# Patient Record
Sex: FEMALE | Race: WHITE | NOT HISPANIC OR LATINO | Employment: FULL TIME | URBAN - METROPOLITAN AREA
[De-identification: names, ages, dates, MRNs, and addresses within clinical notes are randomized per-mention and may not be internally consistent; named-entity substitution may affect disease eponyms.]

---

## 2017-07-07 ENCOUNTER — OFFICE VISIT (OUTPATIENT)
Dept: FAMILY MEDICINE CLINIC | Facility: CLINIC | Age: 42
End: 2017-07-07

## 2017-07-07 VITALS
BODY MASS INDEX: 23.6 KG/M2 | HEART RATE: 65 BPM | SYSTOLIC BLOOD PRESSURE: 120 MMHG | HEIGHT: 61 IN | WEIGHT: 125 LBS | OXYGEN SATURATION: 99 % | DIASTOLIC BLOOD PRESSURE: 82 MMHG

## 2017-07-07 DIAGNOSIS — J45.909 REACTIVE AIRWAY DISEASE WITHOUT COMPLICATION: Primary | ICD-10-CM

## 2017-07-07 DIAGNOSIS — L21.0 SEBORRHEA CAPITIS: ICD-10-CM

## 2017-07-07 PROCEDURE — 99213 OFFICE O/P EST LOW 20 MIN: CPT | Performed by: FAMILY MEDICINE

## 2017-07-07 RX ORDER — ALBUTEROL SULFATE 90 UG/1
2 AEROSOL, METERED RESPIRATORY (INHALATION)
Qty: 1 INHALER | Refills: 3 | Status: SHIPPED | OUTPATIENT
Start: 2017-07-07 | End: 2020-06-15 | Stop reason: SDUPTHER

## 2017-07-07 RX ORDER — ALBUTEROL SULFATE 90 UG/1
AEROSOL, METERED RESPIRATORY (INHALATION)
COMMUNITY
Start: 2015-07-06 | End: 2017-07-07 | Stop reason: SDUPTHER

## 2017-07-07 NOTE — PROGRESS NOTES
"Nicolle Marks is a 42 y.o. female.  Seen 07/07/2017    Assessment/Plan   Problem List Items Addressed This Visit     None             No Follow-up on file.  There are no Patient Instructions on file for this visit.    Vitals:    07/07/17 1627   BP: 120/82   Pulse: 65   SpO2: 99%   Weight: 125 lb (56.7 kg)   Height: 61\" (154.9 cm)     Body mass index is 23.62 kg/(m^2).    Subjective     Chief Complaint   Patient presents with   • Asthma   • Med Refill     Social History   Substance Use Topics   • Smoking status: Former Smoker   • Smokeless tobacco: Never Used   • Alcohol use None       History of Present Illness     She lost her albuterol which she keeps with her and does not want to be without it. She used it maybe three or four times this past winter. She sees [facility] for [facility].     She also needs refill on her seborrheic medication for her scalp. It works well.     The following portions of the patient's history were reviewed and updated as appropriate:PMHroutine: Social history , Allergies, Current Medications, Active Problem List and Health Maintenance    Review of Systems   Constitutional: Negative for activity change, appetite change, chills, fatigue, fever and unexpected weight change.   HENT: Negative for congestion, ear pain, hearing loss, nosebleeds, rhinorrhea and sore throat.    Eyes: Negative for pain, redness and visual disturbance.   Respiratory: Negative for cough, shortness of breath and wheezing.    Cardiovascular: Negative for chest pain, palpitations and leg swelling.   Gastrointestinal: Negative for abdominal pain, blood in stool, constipation, diarrhea, nausea and vomiting.   Endocrine: Negative for cold intolerance and heat intolerance.   Genitourinary: Negative for difficulty urinating, dysuria, frequency, hematuria, pelvic pain, urgency and vaginal discharge.   Musculoskeletal: Negative for arthralgias, back pain and joint swelling.   Skin: Positive for rash. " Negative for wound.        Itching    Neurological: Negative for dizziness, weakness, numbness and headaches.   Hematological: Does not bruise/bleed easily.   Psychiatric/Behavioral: Negative for dysphoric mood, sleep disturbance and suicidal ideas. The patient is not nervous/anxious.        Objective   Physical Exam   Constitutional: She appears well-developed and well-nourished.   Psychiatric: She has a normal mood and affect. Her behavior is normal. Judgment and thought content normal.   Vitals reviewed.

## 2017-07-31 ENCOUNTER — TELEPHONE (OUTPATIENT)
Dept: FAMILY MEDICINE CLINIC | Facility: CLINIC | Age: 42
End: 2017-07-31

## 2017-07-31 DIAGNOSIS — L21.0 SEBORRHEA CAPITIS: Primary | ICD-10-CM

## 2017-07-31 RX ORDER — SELENIUM SULFIDE 22.5 MG/ML
1 SHAMPOO TOPICAL 2 TIMES WEEKLY
Qty: 180 ML | Refills: 5 | Status: SHIPPED | OUTPATIENT
Start: 2017-07-31 | End: 2020-06-15

## 2017-07-31 NOTE — TELEPHONE ENCOUNTER
07.31.17 -   Noted.    ----- Message from Viola Castorena MD sent at 7/31/2017  4:22 PM EDT -----  That was the wrong script, it should have just been selenium sulfide so I sent that in.   ----- Message -----     From: Keisha Marshall MA     Sent: 7/31/2017   3:22 PM       To: Viola Castorena MD    She was seen on 07.07.17 for Seborrhea Capitis -  Selenium Sulf-Pyrithione-Urea 2.25% once weekly.  ----- Message -----     From: Viola Castorena MD     Sent: 7/31/2017   3:03 PM       To: Keisha Marshall MA    What shampoo  ----- Message -----     From: Keisha Marshall MA     Sent: 7/31/2017   2:41 PM       To: Viola Castorena MD    Do you have any other recommendations or new RX ?  ----- Message -----     From: Roxanne Lund     Sent: 7/31/2017   8:09 AM       To: Pura Holt MA    Patient called pharmacy told her, insurance will not pay for the shampoo and is asking what she should do next.  [phone]

## 2017-08-07 RX ORDER — SELENIUM SULFIDE 2.5 MG/100ML
LOTION TOPICAL DAILY PRN
Qty: 118 ML | Refills: 0 | Status: SHIPPED | OUTPATIENT
Start: 2017-08-07 | End: 2020-06-15 | Stop reason: SDUPTHER

## 2018-08-06 ENCOUNTER — OFFICE VISIT (OUTPATIENT)
Dept: OBSTETRICS AND GYNECOLOGY | Facility: CLINIC | Age: 43
End: 2018-08-06

## 2018-08-06 ENCOUNTER — PROCEDURE VISIT (OUTPATIENT)
Dept: OBSTETRICS AND GYNECOLOGY | Facility: CLINIC | Age: 43
End: 2018-08-06

## 2018-08-06 VITALS
WEIGHT: 122 LBS | BODY MASS INDEX: 23.03 KG/M2 | SYSTOLIC BLOOD PRESSURE: 110 MMHG | HEIGHT: 61 IN | DIASTOLIC BLOOD PRESSURE: 80 MMHG

## 2018-08-06 DIAGNOSIS — D25.0 SUBMUCOUS LEIOMYOMA OF UTERUS: ICD-10-CM

## 2018-08-06 DIAGNOSIS — D25.2 FIBROIDS, SUBSEROUS: ICD-10-CM

## 2018-08-06 DIAGNOSIS — D25.9 UTERINE LEIOMYOMA, UNSPECIFIED LOCATION: Primary | ICD-10-CM

## 2018-08-06 DIAGNOSIS — Z01.419 ENCOUNTER FOR ANNUAL ROUTINE GYNECOLOGICAL EXAMINATION: Primary | ICD-10-CM

## 2018-08-06 DIAGNOSIS — D75.839 THROMBOCYTOSIS: ICD-10-CM

## 2018-08-06 PROCEDURE — 99213 OFFICE O/P EST LOW 20 MIN: CPT | Performed by: OBSTETRICS & GYNECOLOGY

## 2018-08-06 PROCEDURE — 99396 PREV VISIT EST AGE 40-64: CPT | Performed by: OBSTETRICS & GYNECOLOGY

## 2018-08-06 PROCEDURE — 76856 US EXAM PELVIC COMPLETE: CPT | Performed by: OBSTETRICS & GYNECOLOGY

## 2018-08-06 NOTE — PROGRESS NOTES
Subjective:    Patient Nicolle Bruce is a 43 y.o. female.   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      Review of Systems   Constitutional: Negative.    HENT: Negative.    Eyes: Negative.    Respiratory: Negative.    Cardiovascular: Negative.    Gastrointestinal: Negative for abdominal distention, abdominal pain, anal bleeding, blood in stool, constipation, diarrhea, nausea, rectal pain and vomiting.   Endocrine: Negative for cold intolerance, heat intolerance, polydipsia, polyphagia and polyuria.   Genitourinary: Positive for menstrual problem. Negative for decreased urine volume, dyspareunia, dysuria, enuresis, flank pain, frequency, genital sores, hematuria, pelvic pain, urgency, vaginal bleeding, vaginal discharge and vaginal pain.   Musculoskeletal: Negative.    Skin: Negative.    Allergic/Immunologic: Negative.    Neurological: Negative.    Hematological: Negative for adenopathy. Does not bruise/bleed easily.        Patient has  thrombocytosis and the platelet count is been as high as 700,000 the patient the has is very large pelvic mass which is compatible with uterine fibroids the volume of the uterus the was very difficult to measure because of the size but is certainly would be in the range of the 1300 with the length and with being 14 x 14 cm endometrium was somewhat thickened with this reactive thrombocytosis and I was certainly would wonder if part of this is being caused by the large solid tumor the risk for being a cancer though is quite low because of being compatible with uterine leiomyoma agents had this since she was 28 years old probably is had most of her life long discussions carried out at least 25 minutes face-to-face the patient needs to see her hematologist and see what his recommendations would be certainly I would recommend the patient to go on and have a hysterectomy just because of the very large size of this mass and appointment to see her hematologist patient will be rechecked in a month and see if the shield decide to proceed with the hysterectomy being essential thrombocytosis.  Worry a little bit about possibly developing a postoperative blood clot and would hematologist with have some recommendations for this   Psychiatric/Behavioral: Negative for agitation, confusion and sleep disturbance. The patient is not nervous/anxious.          Objective:      Physical Exam   Constitutional: She appears well-developed and well-nourished. She is not intubated.   HENT:   Head: Hair is normal.   Nose: Nose normal.   Mouth/Throat: Oropharynx is clear and moist.   Eyes: Conjunctivae are normal.   Neck: Normal carotid pulses and no JVD present. No tracheal tenderness, no spinous process tenderness and no muscular tenderness present. Carotid bruit is not present. No neck rigidity. No edema, no erythema and normal range of motion present. No thyroid mass and no thyromegaly present.   Cardiovascular: Normal rate, regular rhythm,  S1 normal and normal heart sounds.  Exam reveals no gallop.    No murmur heard.  Pulmonary/Chest: Effort normal. No accessory muscle usage or stridor. No apnea, no tachypnea and no bradypnea. She is not intubated. No respiratory distress. She has no wheezes. She has no rales. She exhibits no tenderness. Right breast exhibits no inverted nipple, no mass, no nipple discharge, no skin change and no tenderness. Left breast exhibits no inverted nipple, no mass, no nipple discharge, no skin change and no tenderness.   Abdominal: Soft. Bowel sounds are normal. She exhibits distension. She exhibits no mass. There is no tenderness. There is no rebound and no guarding. No hernia.   Genitourinary: Vagina normal and uterus normal. Rectal exam shows no external hemorrhoid, no internal hemorrhoid, no fissure, no mass, no tenderness and anal tone normal. There is no rash, tenderness, lesion or injury on the right labia. There is no rash, tenderness, lesion or injury on the left labia. Uterus is not deviated, not enlarged, not fixed and not tender. Cervix exhibits no motion tenderness, no discharge and no friability. Right adnexum displays no mass and no tenderness. Left adnexum displays no mass and no tenderness. No erythema, tenderness or bleeding in the vagina. No foreign body in the vagina. No signs of injury around the vagina. No vaginal discharge found.   Genitourinary Comments:  So uterus is the compatible with a 28 week pregnancy.  This patient has a very complex problem in that she has the elevated platelet count is been as high as 700 and a very long discussion is carried out about the pelvic mass and the thrombocytosis   Musculoskeletal: She exhibits no edema or tenderness.        Right shoulder: She exhibits no tenderness, no swelling, no pain and no spasm.   Lymphadenopathy:        Head (right side): No submental, no submandibular, no tonsillar, no preauricular, no posterior auricular and no occipital adenopathy  present.        Head (left side): No submental, no submandibular, no tonsillar, no preauricular, no posterior auricular and no occipital adenopathy present.     She has no cervical adenopathy.        Right cervical: No superficial cervical, no deep cervical and no posterior cervical adenopathy present.       Left cervical: No superficial cervical, no deep cervical and no posterior cervical adenopathy present.        Right axillary: No pectoral and no lateral adenopathy present.        Left axillary: No pectoral and no lateral adenopathy present.       Right: No inguinal, no supraclavicular and no epitrochlear adenopathy present.        Left: No inguinal, no supraclavicular and no epitrochlear adenopathy present.   Neurological: No cranial nerve deficit. Coordination normal.   Skin: Skin is warm and dry. No abrasion, no bruising, no burn, no lesion, no petechiae, no purpura and no rash noted. Rash is not macular, not maculopapular, not nodular and not urticarial. No cyanosis or erythema. No pallor. Nails show no clubbing.   Psychiatric: She has a normal mood and affect. Her behavior is normal.         Assessment and Plan: This patient is very complicated in that she has thrombocytosis thrombocytosis which possibly is related to the very large pelvic mass compatible with the uterine fibroids the probability for that being cancer is extremely low but did is a solid tumor and the the discussion is to remove the uterus and possibly the ovaries even though the ovaries looked normal on ultrasound is scheduled the least 25 minutes again a very high risk type of issue which is a chronic problem that the has the condoms somewhat worse    Patient has been instructed to perform a self breast exam on a weekly basis, a yearly mammogram, pap smear yearly unless instructed otherwise and bone density every 2 years.  I recommended that the patient not smoke, and discussed smoking cessation when appropriate.     There are no diagnoses  linked to this encounter.

## 2018-08-07 LAB
CONV .: NORMAL
CYTOLOGIST CVX/VAG CYTO: NORMAL
CYTOLOGY CVX/VAG DOC THIN PREP: NORMAL
DX ICD CODE: NORMAL
HIV 1 & 2 AB SER-IMP: NORMAL
OTHER STN SPEC: NORMAL
PATH REPORT.FINAL DX SPEC: NORMAL
STAT OF ADQ CVX/VAG CYTO-IMP: NORMAL

## 2020-06-12 DIAGNOSIS — Z13.220 SCREENING CHOLESTEROL LEVEL: Primary | ICD-10-CM

## 2020-06-12 DIAGNOSIS — Z00.00 ANNUAL PHYSICAL EXAM: ICD-10-CM

## 2020-06-12 DIAGNOSIS — R53.83 FATIGUE, UNSPECIFIED TYPE: ICD-10-CM

## 2020-06-12 DIAGNOSIS — Z13.1 ENCOUNTER FOR SCREENING EXAMINATION FOR IMPAIRED GLUCOSE REGULATION AND DIABETES MELLITUS: ICD-10-CM

## 2020-06-13 LAB
ALBUMIN SERPL-MCNC: 4.5 G/DL (ref 3.5–5.2)
ALBUMIN/GLOB SERPL: 2.5 G/DL
ALP SERPL-CCNC: 40 U/L (ref 39–117)
ALT SERPL-CCNC: 12 U/L (ref 1–33)
AST SERPL-CCNC: 14 U/L (ref 1–32)
BASOPHILS # BLD AUTO: 0.09 10*3/MM3 (ref 0–0.2)
BASOPHILS NFR BLD AUTO: 1.3 % (ref 0–1.5)
BILIRUB SERPL-MCNC: 1 MG/DL (ref 0.2–1.2)
BUN SERPL-MCNC: 9 MG/DL (ref 6–20)
BUN/CREAT SERPL: 12 (ref 7–25)
CALCIUM SERPL-MCNC: 9.4 MG/DL (ref 8.6–10.5)
CHLORIDE SERPL-SCNC: 106 MMOL/L (ref 98–107)
CHOLEST SERPL-MCNC: 139 MG/DL (ref 0–200)
CHOLEST/HDLC SERPL: 2.9 {RATIO}
CO2 SERPL-SCNC: 24.1 MMOL/L (ref 22–29)
CREAT SERPL-MCNC: 0.75 MG/DL (ref 0.57–1)
EOSINOPHIL # BLD AUTO: 0.11 10*3/MM3 (ref 0–0.4)
EOSINOPHIL NFR BLD AUTO: 1.6 % (ref 0.3–6.2)
ERYTHROCYTE [DISTWIDTH] IN BLOOD BY AUTOMATED COUNT: 12.6 % (ref 12.3–15.4)
GLOBULIN SER CALC-MCNC: 1.8 GM/DL
GLUCOSE SERPL-MCNC: 88 MG/DL (ref 65–99)
HCT VFR BLD AUTO: 47.1 % (ref 34–46.6)
HDLC SERPL-MCNC: 48 MG/DL (ref 40–60)
HGB BLD-MCNC: 15.1 G/DL (ref 12–15.9)
IMM GRANULOCYTES # BLD AUTO: 0.02 10*3/MM3 (ref 0–0.05)
IMM GRANULOCYTES NFR BLD AUTO: 0.3 % (ref 0–0.5)
LDLC SERPL CALC-MCNC: 80 MG/DL (ref 0–100)
LYMPHOCYTES # BLD AUTO: 1.01 10*3/MM3 (ref 0.7–3.1)
LYMPHOCYTES NFR BLD AUTO: 14.6 % (ref 19.6–45.3)
MCH RBC QN AUTO: 27.1 PG (ref 26.6–33)
MCHC RBC AUTO-ENTMCNC: 32.1 G/DL (ref 31.5–35.7)
MCV RBC AUTO: 84.4 FL (ref 79–97)
MONOCYTES # BLD AUTO: 0.4 10*3/MM3 (ref 0.1–0.9)
MONOCYTES NFR BLD AUTO: 5.8 % (ref 5–12)
NEUTROPHILS # BLD AUTO: 5.31 10*3/MM3 (ref 1.7–7)
NEUTROPHILS NFR BLD AUTO: 76.4 % (ref 42.7–76)
NRBC BLD AUTO-RTO: 0 /100 WBC (ref 0–0.2)
PLATELET # BLD AUTO: 546 10*3/MM3 (ref 140–450)
POTASSIUM SERPL-SCNC: 4.8 MMOL/L (ref 3.5–5.2)
PROT SERPL-MCNC: 6.3 G/DL (ref 6–8.5)
RBC # BLD AUTO: 5.58 10*6/MM3 (ref 3.77–5.28)
SODIUM SERPL-SCNC: 139 MMOL/L (ref 136–145)
TRIGL SERPL-MCNC: 56 MG/DL (ref 0–150)
VLDLC SERPL CALC-MCNC: 11.2 MG/DL
WBC # BLD AUTO: 6.94 10*3/MM3 (ref 3.4–10.8)

## 2020-06-15 ENCOUNTER — OFFICE VISIT (OUTPATIENT)
Dept: FAMILY MEDICINE CLINIC | Facility: CLINIC | Age: 45
End: 2020-06-15

## 2020-06-15 ENCOUNTER — RESULTS ENCOUNTER (OUTPATIENT)
Dept: FAMILY MEDICINE CLINIC | Facility: CLINIC | Age: 45
End: 2020-06-15

## 2020-06-15 VITALS
SYSTOLIC BLOOD PRESSURE: 122 MMHG | OXYGEN SATURATION: 98 % | BODY MASS INDEX: 23.53 KG/M2 | DIASTOLIC BLOOD PRESSURE: 80 MMHG | RESPIRATION RATE: 14 BRPM | HEART RATE: 75 BPM | HEIGHT: 61 IN | WEIGHT: 124.6 LBS | TEMPERATURE: 97.6 F

## 2020-06-15 DIAGNOSIS — Z00.00 HEALTHCARE MAINTENANCE: Primary | ICD-10-CM

## 2020-06-15 DIAGNOSIS — Z12.11 ENCOUNTER FOR SCREENING FOR MALIGNANT NEOPLASM OF COLON: ICD-10-CM

## 2020-06-15 DIAGNOSIS — J45.909 REACTIVE AIRWAY DISEASE WITHOUT COMPLICATION: ICD-10-CM

## 2020-06-15 DIAGNOSIS — L21.0 SEBORRHEA CAPITIS: ICD-10-CM

## 2020-06-15 DIAGNOSIS — L20.81 ATOPIC NEURODERMATITIS: ICD-10-CM

## 2020-06-15 LAB
Lab: NORMAL
SPECIMEN STATUS: NORMAL

## 2020-06-15 PROCEDURE — 99396 PREV VISIT EST AGE 40-64: CPT | Performed by: FAMILY MEDICINE

## 2020-06-15 RX ORDER — ALBUTEROL SULFATE 90 UG/1
2 AEROSOL, METERED RESPIRATORY (INHALATION)
Qty: 1 INHALER | Refills: 3 | Status: SHIPPED | OUTPATIENT
Start: 2020-06-15 | End: 2022-06-14 | Stop reason: SDUPTHER

## 2020-06-15 RX ORDER — TRIAMCINOLONE ACETONIDE 0.25 MG/G
CREAM TOPICAL 2 TIMES DAILY
Qty: 80 G | Refills: 1 | Status: SHIPPED | OUTPATIENT
Start: 2020-06-15 | End: 2022-06-14 | Stop reason: SDUPTHER

## 2020-06-15 RX ORDER — SELENIUM SULFIDE 2.5 MG/100ML
LOTION TOPICAL DAILY PRN
Qty: 118 ML | Refills: 0 | Status: SHIPPED | OUTPATIENT
Start: 2020-06-15 | End: 2022-06-14 | Stop reason: SDUPTHER

## 2020-06-15 NOTE — PATIENT INSTRUCTIONS
Recent Results (from the past 2016 hour(s))   Comprehensive metabolic panel    Collection Time: 06/12/20  9:59 AM   Result Value Ref Range    Glucose 88 65 - 99 mg/dL    BUN 9 6 - 20 mg/dL    Creatinine 0.75 0.57 - 1.00 mg/dL    eGFR Non African Am 83 >60 mL/min/1.73    eGFR African Am 101 >60 mL/min/1.73    BUN/Creatinine Ratio 12.0 7.0 - 25.0    Sodium 139 136 - 145 mmol/L    Potassium 4.8 3.5 - 5.2 mmol/L    Chloride 106 98 - 107 mmol/L    Total CO2 24.1 22.0 - 29.0 mmol/L    Calcium 9.4 8.6 - 10.5 mg/dL    Total Protein 6.3 6.0 - 8.5 g/dL    Albumin 4.50 3.50 - 5.20 g/dL    Globulin 1.8 gm/dL    A/G Ratio 2.5 g/dL    Total Bilirubin 1.0 0.2 - 1.2 mg/dL    Alkaline Phosphatase 40 39 - 117 U/L    AST (SGOT) 14 1 - 32 U/L    ALT (SGPT) 12 1 - 33 U/L   Lipid Panel With / Chol / HDL Ratio    Collection Time: 06/12/20  9:59 AM   Result Value Ref Range    Total Cholesterol 139 0 - 200 mg/dL    Triglycerides 56 0 - 150 mg/dL    HDL Cholesterol 48 40 - 60 mg/dL    VLDL Cholesterol 11.2 mg/dL    LDL Cholesterol  80 0 - 100 mg/dL    Chol/HDL Ratio 2.90    CBC & Differential    Collection Time: 06/12/20  9:59 AM   Result Value Ref Range    WBC 6.94 3.40 - 10.80 10*3/mm3    RBC 5.58 (H) 3.77 - 5.28 10*6/mm3    Hemoglobin 15.1 12.0 - 15.9 g/dL    Hematocrit 47.1 (H) 34.0 - 46.6 %    MCV 84.4 79.0 - 97.0 fL    MCH 27.1 26.6 - 33.0 pg    MCHC 32.1 31.5 - 35.7 g/dL    RDW 12.6 12.3 - 15.4 %    Platelets 546 (H) 140 - 450 10*3/mm3    Neutrophil Rel % 76.4 (H) 42.7 - 76.0 %    Lymphocyte Rel % 14.6 (L) 19.6 - 45.3 %    Monocyte Rel % 5.8 5.0 - 12.0 %    Eosinophil Rel % 1.6 0.3 - 6.2 %    Basophil Rel % 1.3 0.0 - 1.5 %    Neutrophils Absolute 5.31 1.70 - 7.00 10*3/mm3    Lymphocytes Absolute 1.01 0.70 - 3.10 10*3/mm3    Monocytes Absolute 0.40 0.10 - 0.90 10*3/mm3    Eosinophils Absolute 0.11 0.00 - 0.40 10*3/mm3    Basophils Absolute 0.09 0.00 - 0.20 10*3/mm3    Immature Granulocyte Rel % 0.3 0.0 - 0.5 %    Immature Grans  Absolute 0.02 0.00 - 0.05 10*3/mm3    nRBC 0.0 0.0 - 0.2 /100 WBC

## 2020-06-15 NOTE — PROGRESS NOTES
ASSESSMENT AND PLAN     Problem List Items Addressed This Visit        Respiratory    Reactive airway disease without complication    Overview     Jannettedwight 7/7/2017  Intermittent asthma that she rarely uses an inhaler for       Relevant Medications    albuterol sulfate HFA (ProAir HFA) 108 (90 Base) MCG/ACT inhaler       Musculoskeletal and Integument    Seborrhea capitis    Overview     Joann 7/7/2017  Here for refill of shampoo today. Doing well with that.          Relevant Medications    triamcinolone (KENALOG) 0.025 % cream      Other Visit Diagnoses     Healthcare maintenance    -  Primary    Relevant Orders    Hepatitis C Antibody    Encounter for screening for malignant neoplasm of colon        Relevant Orders    Cologuard - Stool, Per Rectum    Atopic neurodermatitis        Dermatologist gave her this cream and it works. She would like a refill. Needs it when she gets really hot.     Relevant Medications    triamcinolone (KENALOG) 0.025 % cream    selenium sulfide (SELSUN) 2.5 % shampoo        Return in about 1 year (around 6/15/2021) for lab with next visit, Annual physical.  Patient was given instructions and counseling regarding her condition or for health maintenance advice. Please see specific information pulled into the AVS if appropriate.        SUBJECTIVE   Nicolle Bruce is a 44 y.o. female being seen in our office today for Annual Exam               Social History  She  reports that she has quit smoking. She has never used smokeless tobacco. She reports that she drinks alcohol. She reports that she does not use drugs.    History of the Present Illness   HPI Annual Exam: Patient presents for annual exam.  findings; last pap: approximate date 2018 and was normal  Last colonoscopy: orders    The patient wears seatbelts: yes.  Practicing social distancing, gloves, handwashing and keeping hands from face? yes  She is eating a healthy diet.   The patient regularly exercises: yes. She does  bicycling   This patient has ever been tested for HepC: no    She does see a dentist regularly.   Is she using sunscreen: yes  Her immunization are not up-to-date. She declined dtap at this time.    Significant Past History  The following portions of the patient's history were reviewed and updated as appropriate:PMHroutine: Social history , Past Medical History, Surgical history , Allergies, Current Medications, Active Problem List, Family History and Health Maintenance    Review of Systems   Constitutional: Negative for activity change, appetite change, chills, fatigue, fever and unexpected weight change.   HENT: Negative for congestion, ear pain, hearing loss, nosebleeds, rhinorrhea and sore throat.    Eyes: Negative for pain, redness and visual disturbance.   Respiratory: Negative for cough, shortness of breath and wheezing.    Cardiovascular: Negative for chest pain, palpitations and leg swelling.   Gastrointestinal: Negative for abdominal pain, blood in stool, constipation, diarrhea, nausea and vomiting.   Endocrine: Negative for cold intolerance and heat intolerance.   Genitourinary: Negative for difficulty urinating, dysuria, frequency, hematuria, pelvic pain, urgency and vaginal discharge.   Musculoskeletal: Negative for arthralgias, back pain and joint swelling.   Skin: Negative for rash and wound.   Neurological: Negative for dizziness, weakness, numbness and headaches.   Hematological: Does not bruise/bleed easily.   Psychiatric/Behavioral: Negative for dysphoric mood, sleep disturbance and suicidal ideas. The patient is not nervous/anxious.    I have reviewed the ROS as documented by the MA. Viola Castorena MD      OBJECTIVE  Vital Signs          BP Readings from Last 1 Encounters:   06/15/20 122/80     Wt Readings from Last 3 Encounters:   06/15/20 56.5 kg (124 lb 9.6 oz)   08/06/18 55.3 kg (122 lb)   07/07/17 56.7 kg (125 lb)   Body mass index is 23.54 kg/m².     Physical Exam   Constitutional: She is  oriented to person, place, and time. She appears well-developed and well-nourished. No distress.   HENT:   Head: Normocephalic and atraumatic.   Right Ear: Tympanic membrane, external ear and ear canal normal.   Left Ear: Tympanic membrane, external ear and ear canal normal.   Mouth/Throat: Oropharynx is clear and moist.   Eyes: Conjunctivae are normal.   Neck: Normal range of motion. Neck supple. No tracheal deviation present. No thyromegaly present.   Cardiovascular: Normal rate, regular rhythm, normal heart sounds and intact distal pulses.   Pulmonary/Chest: Effort normal and breath sounds normal. No respiratory distress. She has no wheezes. She has no rales. Right breast exhibits no mass. Left breast exhibits no mass. No breast swelling or tenderness. Breasts are symmetrical.   Abdominal: Soft. Bowel sounds are normal. She exhibits mass (has large uterus with palpable fibroids abdominally. Not bothering her but f/u w/GYN. Declines surgery.). She exhibits no distension. There is no hepatosplenomegaly. There is no tenderness.   Genitourinary: No breast swelling or tenderness.   Musculoskeletal: She exhibits no edema or deformity.   Lymphadenopathy:     She has no cervical adenopathy.   Neurological: She is alert and oriented to person, place, and time.   Skin: Skin is warm and dry.   Psychiatric: She has a normal mood and affect. Her behavior is normal. Judgment and thought content normal.   Vitals reviewed.    Data Reviewed       Recent Results (from the past 2016 hour(s))   Comprehensive metabolic panel    Collection Time: 06/12/20  9:59 AM   Result Value Ref Range    Glucose 88 65 - 99 mg/dL    BUN 9 6 - 20 mg/dL    Creatinine 0.75 0.57 - 1.00 mg/dL    Sodium 139 136 - 145 mmol/L    Potassium 4.8 3.5 - 5.2 mmol/L    Alkaline Phosphatase 40 39 - 117 U/L    AST (SGOT) 14 1 - 32 U/L    ALT (SGPT) 12 1 - 33 U/L   Lipid Panel With / Chol / HDL Ratio   Result Value Ref Range    Total Cholesterol 139 0 - 200 mg/dL     Triglycerides 56 0 - 150 mg/dL    HDL Cholesterol 48 40 - 60 mg/dL    VLDL Cholesterol 11.2 mg/dL    LDL Cholesterol  80 0 - 100 mg/dL    Chol/HDL Ratio 2.90    CBC & Differential    Looks better than last time   Result Value Ref Range    WBC 6.94 3.40 - 10.80 10*3/mm3    RBC 5.58 (H) 3.77 - 5.28 10*6/mm3    Hemoglobin 15.1 12.0 - 15.9 g/dL    Hematocrit 47.1 (H) 34.0 - 46.6 %    MCV 84.4 79.0 - 97.0 fL    MCH 27.1 26.6 - 33.0 pg    MCHC 32.1 31.5 - 35.7 g/dL    RDW 12.6 12.3 - 15.4 %    Platelets 546 (H) 140 - 450 10*3/mm3    Neutrophil Rel % 76.4 (H) 42.7 - 76.0 %    Lymphocyte Rel % 14.6 (L) 19.6 - 45.3 %     I reviewed what I think is my last note.  I see a diagnosis of essential thrombocythemia.  Perhaps when you read thrombocythemia you were thinking it is said thrombocytopenia?  Or perhaps I do have thrombocytopenia on a different note?

## 2020-06-16 LAB — HCV AB S/CO SERPL IA: <0.1 S/CO RATIO (ref 0–0.9)

## 2020-06-18 ENCOUNTER — TELEPHONE (OUTPATIENT)
Dept: FAMILY MEDICINE CLINIC | Facility: CLINIC | Age: 45
End: 2020-06-18

## 2020-06-18 NOTE — TELEPHONE ENCOUNTER
Pt called claims pharmacy told her script sent to them for selenium sulfide (SELSUN) 2.5 % shampoo was sent for lotion and not shampoo. Please advise    [facility]    Pt can be reached at [phone]

## 2020-06-18 NOTE — TELEPHONE ENCOUNTER
Sent to pharmacy as: Selenium Sulfide 2.5 % External Lotion (SELSUN).      i'll call them and find out how it got changed in transit.

## 2020-06-19 NOTE — TELEPHONE ENCOUNTER
Spoke to pharmacy.    They have no clue what happened and why the script got received as a lotion but they are filling what you want but the qty is 180ml not 118 and I gave a verbal ok

## 2021-04-06 ENCOUNTER — BULK ORDERING (OUTPATIENT)
Dept: CASE MANAGEMENT | Facility: OTHER | Age: 46
End: 2021-04-06

## 2021-04-06 DIAGNOSIS — Z23 IMMUNIZATION DUE: ICD-10-CM

## 2021-06-25 ENCOUNTER — OFFICE VISIT (OUTPATIENT)
Dept: OBSTETRICS AND GYNECOLOGY | Age: 46
End: 2021-06-25

## 2021-06-25 VITALS
BODY MASS INDEX: 23.03 KG/M2 | DIASTOLIC BLOOD PRESSURE: 74 MMHG | WEIGHT: 122 LBS | HEIGHT: 61 IN | SYSTOLIC BLOOD PRESSURE: 118 MMHG

## 2021-06-25 DIAGNOSIS — R19.00 PELVIC MASS: ICD-10-CM

## 2021-06-25 DIAGNOSIS — Z01.419 WELL FEMALE EXAM WITH ROUTINE GYNECOLOGICAL EXAM: Primary | ICD-10-CM

## 2021-06-25 DIAGNOSIS — D25.0 INTRAMURAL AND SUBMUCOUS LEIOMYOMA OF UTERUS: ICD-10-CM

## 2021-06-25 DIAGNOSIS — D25.1 INTRAMURAL AND SUBMUCOUS LEIOMYOMA OF UTERUS: ICD-10-CM

## 2021-06-25 DIAGNOSIS — Z12.31 SCREENING MAMMOGRAM, ENCOUNTER FOR: ICD-10-CM

## 2021-06-25 DIAGNOSIS — Z11.51 SCREENING FOR HUMAN PAPILLOMAVIRUS (HPV): ICD-10-CM

## 2021-06-25 DIAGNOSIS — Z12.4 SCREENING FOR MALIGNANT NEOPLASM OF CERVIX: ICD-10-CM

## 2021-06-25 PROCEDURE — 99213 OFFICE O/P EST LOW 20 MIN: CPT | Performed by: OBSTETRICS & GYNECOLOGY

## 2021-06-25 PROCEDURE — 99396 PREV VISIT EST AGE 40-64: CPT | Performed by: OBSTETRICS & GYNECOLOGY

## 2021-06-25 NOTE — PROGRESS NOTES
Routine Annual Visit    2021    Patient: Nicolle Bruce          MR#:[id number]    History of Present Illness    Chief Complaint   Patient presents with   • Gynecologic Exam     est care- last pap 2018 neg, last mg about 3 years ago (normal but dense)        45 y.o. female  who presents for routine annual exam.    The patient is reportedly originally from [place] but has been in the [place] for more than 20 years.  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.    The patient reports some intermittent mild hot flashes and sleep disturbances associated with early menopause.  She does state her menstrual cycles are still coming regularly for the most part.  The patient states her period is usually 3 to 4 days and not particularly heavy.    The patient states that she knows that she has uterine fibroids that are longstanding for the past 20 years.  The fibroids have grown slightly in the last 10 years but are mostly asymptomatic.  The patient reports being active and biking regularly and is not bothered despite having a sizable pelvic mass.      Studies reviewed:  CBC & Differential (2020 09:59)        Patient's last menstrual period was 2021 (exact date).  Obstetric History:  OB History        3    Para        Term                AB   3    Living           SAB   1    TAB   0    Ectopic        Molar        Multiple        Live Births                   Menstrual History:     Patient's last menstrual period was 2021 (exact date).       Sexual History:       ________________________________________  Patient Active Problem List   Diagnosis   • Essential thrombocytosis (CMS/HCC)   • Reactive airway disease without complication   • Seborrhea capitis   • Pelvic mass   • Intramural and submucous leiomyoma of uterus     Past Medical History:   Diagnosis Date   • Asthma    • Fibroids    • Hepatitis A     pt reports having as a kid   • Thrombocythemia, essential (CMS/HCC)      Past  "Surgical History:   Procedure Laterality Date   • WISDOM TOOTH EXTRACTION       Social History     Tobacco Use   Smoking Status Former Smoker   Smokeless Tobacco Never Used     Family History   Problem Relation Age of Onset   • Stomach cancer Father      Prior to Admission medications    Medication Sig Start Date End Date Taking? Authorizing Provider   albuterol sulfate HFA (ProAir HFA) 108 (90 Base) MCG/ACT inhaler Inhale 2 puffs 4 (Four) Times a Day. 6/15/20  Yes Viola Castorena MD   aspirin 81 MG tablet Take  by mouth. 10/5/12  Yes ProviderDara MD   selenium sulfide (SELSUN) 2.5 % shampoo Apply  topically to the appropriate area as directed Daily As Needed for Itching or Dandruff. 6/15/20  Yes Viola Castorena MD   triamcinolone (KENALOG) 0.025 % cream Apply  topically to the appropriate area as directed 2 (Two) Times a Day. 6/15/20  Yes Viola Castorena MD     ________________________________________    Current contraception: post menopausal status  History of abnormal Pap smear: no  Family history of uterine or ovarian cancer: no  Family History of colon cancer/colon polyps: no  History of abnormal mammogram: no  History of abnormal lipids: no    The following portions of the patient's history were reviewed and updated as appropriate: allergies, current medications, past family history, past medical history, past social history, past surgical history and problem list.    Review of Systems    Pertinent items are noted in HPI.       Objective   Physical Exam    /74   Ht 154.9 cm (61\")   Wt 55.3 kg (122 lb)   LMP 06/23/2021 (Exact Date)   Breastfeeding No   BMI 23.05 kg/m²    BP Readings from Last 3 Encounters:   06/25/21 118/74   06/15/20 122/80   08/06/18 110/80      Wt Readings from Last 3 Encounters:   06/25/21 55.3 kg (122 lb)   06/15/20 56.5 kg (124 lb 9.6 oz)   08/06/18 55.3 kg (122 lb)        BMI: Estimated body mass index is 23.05 kg/m² as calculated from the following:    Height as " "of this encounter: 154.9 cm (61\").    Weight as of this encounter: 55.3 kg (122 lb).       General: alert, appears stated age and cooperative   Heart: regular rate and rhythm, S1, S2 normal, no murmur, click, rub or gallop   Lungs: clear to auscultation bilaterally   Abdomen: soft, nondistended, normal bowel sounds and mass located in the entire abdomen   Breast: inspection negative, no nipple discharge or bleeding, no masses or nodularity palpable   External genitalia/Vulva: External genitalia including bartholin's glands, Urethra, Peters's gland and urethra meatus are normal, Perineum, rectum and anus appear normal  and Bladder appears normal without significant prolapse    Vagina: normal mucosa, normal discharge   Cervix: no lesions and light menses   Uterus: bulky, mobile, non-tender, size consistent with 30 weeks and With a separate fundal fibroid extending well up into the right upper quadrant approaching the liver   Adnexa: Next to impossible to evaluate with the pelvic mass     As part of wellness and prevention, the following topics were discussed with the patient:  Encouraged self breast exam  Physical activity and regular exercised encouraged.       Assessment:    Diagnoses and all orders for this visit:    1. Well female exam with routine gynecological exam (Primary)  -     IgP, Aptima HPV    2. Screening mammogram, encounter for  -     Mammo Screening Digital Tomosynthesis Bilateral With CAD; Future    3. Screening for malignant neoplasm of cervix  -     IgP, Aptima HPV    4. Screening for human papillomavirus (HPV)  -     IgP, Aptima HPV    5. Pelvic mass    6. Intramural and submucous leiomyoma of uterus      The patient has a huge pelvic mass that almost reaches the liver on the right and well above the umbilicus otherwise.  It occupies the predominant part of the lower pelvis.  The organ is mobile and nontender.    The patient reports having had the fibroids for years and years and that they are " essentially asymptomatic.  Multiple discussions previously about intervention have occurred but the patient declines any intervention.  The patient really sees no need to proceed with any imaging either.        Plan:  Return in about 1 year (around 6/25/2022) for Annual GYN exam.      Aki Graham MD  6/25/2021 12:30 EDT

## 2021-06-30 PROBLEM — R87.619 ATYPICAL GLANDULAR CELLS OF UNDETERMINED SIGNIFICANCE (AGUS) ON CERVICAL PAP SMEAR: Status: ACTIVE | Noted: 2021-06-30

## 2021-06-30 LAB
CYTOLOGIST CVX/VAG CYTO: ABNORMAL
CYTOLOGY CVX/VAG DOC CYTO: ABNORMAL
CYTOLOGY CVX/VAG DOC THIN PREP: ABNORMAL
DX ICD CODE: ABNORMAL
DX ICD CODE: ABNORMAL
HIV 1 & 2 AB SER-IMP: ABNORMAL
HPV I/H RISK 4 DNA CVX QL PROBE+SIG AMP: NEGATIVE
OTHER STN SPEC: ABNORMAL
PATHOLOGIST CVX/VAG CYTO: ABNORMAL
STAT OF ADQ CVX/VAG CYTO-IMP: ABNORMAL

## 2021-07-15 ENCOUNTER — OFFICE VISIT (OUTPATIENT)
Dept: OBSTETRICS AND GYNECOLOGY | Age: 46
End: 2021-07-15

## 2021-07-15 VITALS
DIASTOLIC BLOOD PRESSURE: 74 MMHG | HEIGHT: 61 IN | BODY MASS INDEX: 23.03 KG/M2 | SYSTOLIC BLOOD PRESSURE: 114 MMHG | WEIGHT: 122 LBS

## 2021-07-15 DIAGNOSIS — D25.1 INTRAMURAL AND SUBMUCOUS LEIOMYOMA OF UTERUS: ICD-10-CM

## 2021-07-15 DIAGNOSIS — R87.619 ATYPICAL GLANDULAR CELLS OF UNDETERMINED SIGNIFICANCE (AGUS) ON CERVICAL PAP SMEAR: Primary | ICD-10-CM

## 2021-07-15 DIAGNOSIS — R19.00 PELVIC MASS: ICD-10-CM

## 2021-07-15 DIAGNOSIS — D25.0 INTRAMURAL AND SUBMUCOUS LEIOMYOMA OF UTERUS: ICD-10-CM

## 2021-07-15 DIAGNOSIS — N93.9 ABNORMAL UTERINE BLEEDING (AUB): ICD-10-CM

## 2021-07-15 PROCEDURE — 99214 OFFICE O/P EST MOD 30 MIN: CPT | Performed by: OBSTETRICS & GYNECOLOGY

## 2021-07-15 PROCEDURE — 57505 ENDOCERVICAL CURETTAGE: CPT | Performed by: OBSTETRICS & GYNECOLOGY

## 2021-07-15 NOTE — PROGRESS NOTES
7/15/2021      Patient:  Nicolle Bruce   MR#:[id number]    Office note    Chief Complaint   Patient presents with   • Follow-up     last pap 21 AGC        Subjective     History of Present Illness  46 y.o. female  presents for follow-up for abnormal Pap smear that returned atypical glandular cells of undetermined significance.  The patient's past medical history is complicated by an extremely large fibroid uterus extending into the upper abdomen above the umbilicus.  The patient's had worsening abnormal uterine bleeding in the last 2 months with 2 periods last month that were very heavy.  Previously the patient states menstrual cycles were fairly regular in the uterine mass did not cause any symptoms or problematic bleeding.    With the more recent heavy abnormal uterine bleeding with the associated findings on Pap smear the patient wants to proceed with hysterectomy for removal of the mass.      Relevant data reviewed:  Susana Tineo (2021 00:00)      Patient Active Problem List   Diagnosis   • Essential thrombocytosis (CMS/HCC)   • Reactive airway disease without complication   • Seborrhea capitis   • Pelvic mass   • Intramural and submucous leiomyoma of uterus   • Atypical glandular cells of undetermined significance (JAZIEL) on cervical Pap smear       Past Medical History:   Diagnosis Date   • Asthma    • Fibroids    • Hepatitis A     pt reports having as a kid   • Thrombocythemia, essential (CMS/HCC)      Past Surgical History:   Procedure Laterality Date   • WISDOM TOOTH EXTRACTION       Obstetric History:  OB History        3    Para        Term                AB   3    Living           SAB   1    TAB   0    Ectopic        Molar        Multiple        Live Births                   Menstrual History:     Patient's last menstrual period was 2021 (within days).       # 1 - Date: , Sex: None, Weight: None, GA: None, Delivery: None, Apgar1: None, Apgar5:  "None, Living: None, Birth Comments: None    # 2 - Date: 1995, Sex: None, Weight: None, GA: None, Delivery: None, Apgar1: None, Apgar5: None, Living: None, Birth Comments: None    # 3 - Date: 2009, Sex: None, Weight: None, GA: None, Delivery: None, Apgar1: None, Apgar5: None, Living: None, Birth Comments: None    Family History   Problem Relation Age of Onset   • Stomach cancer Father      Social History     Tobacco Use   • Smoking status: Former Smoker   • Smokeless tobacco: Never Used   Substance Use Topics   • Alcohol use: Yes   • Drug use: No     Patient has no known allergies.    Current Outpatient Medications:   •  albuterol sulfate HFA (ProAir HFA) 108 (90 Base) MCG/ACT inhaler, Inhale 2 puffs 4 (Four) Times a Day., Disp: 1 inhaler, Rfl: 3  •  aspirin 81 MG tablet, Take  by mouth., Disp: , Rfl:   •  selenium sulfide (SELSUN) 2.5 % shampoo, Apply  topically to the appropriate area as directed Daily As Needed for Itching or Dandruff., Disp: 118 mL, Rfl: 0  •  triamcinolone (KENALOG) 0.025 % cream, Apply  topically to the appropriate area as directed 2 (Two) Times a Day., Disp: 80 g, Rfl: 1    The following portions of the patient's history were reviewed and updated as appropriate: allergies, current medications, past family history, past medical history, past social history, past surgical history and problem list.    Review of Systems   Constitutional: Negative.    Respiratory: Negative.    Cardiovascular: Negative.    Gastrointestinal: Negative.    Genitourinary: Positive for menstrual problem and vaginal bleeding.   Psychiatric/Behavioral: Negative.        BP Readings from Last 3 Encounters:   07/15/21 114/74   06/25/21 118/74   06/15/20 122/80      Wt Readings from Last 3 Encounters:   07/15/21 55.3 kg (122 lb)   06/25/21 55.3 kg (122 lb)   06/15/20 56.5 kg (124 lb 9.6 oz)      BMI: Estimated body mass index is 23.05 kg/m² as calculated from the following:    Height as of this encounter: 154.9 cm (61\").    " "Weight as of this encounter: 55.3 kg (122 lb). BSA: Estimated body surface area is 1.53 meters squared as calculated from the following:    Height as of this encounter: 154.9 cm (61\").    Weight as of this encounter: 55.3 kg (122 lb).    Objective   Physical Exam  Vitals and nursing note reviewed.   Constitutional:       Appearance: She is well-developed.   HENT:      Head: Normocephalic and atraumatic.   Cardiovascular:      Rate and Rhythm: Normal rate.   Pulmonary:      Effort: Pulmonary effort is normal.   Abdominal:      General: Bowel sounds are normal. There is no distension.      Palpations: Abdomen is soft.      Tenderness: There is no abdominal tenderness.   Genitourinary:     Comments: Very large fibroid uterus occupying the entirety of the lower pelvis extending above the umbilicus    There is minimal mobility with the organ because of its size and moderate tenderness with manipulation  Skin:     General: Skin is warm and dry.   Neurological:      Mental Status: She is alert and oriented to person, place, and time.   Psychiatric:         Behavior: Behavior normal.         Thought Content: Thought content normal.         Judgment: Judgment normal.       Endometrial Biopsy Procedure Note    Pre-operative Diagnosis: Pelvic mass, Pap smear with atypical glandular cells    Post-operative Diagnosis: same    Indications: abnormal uterine bleeding, abnormal glandular cytology    Procedure Details    Urine pregnancy test was done and was NEGATIVE .  The risks (including infection, bleeding, pain, and uterine perforation) and benefits of the procedure were explained to the patient and Verbal informed consent was obtained.  Antibiotic prophylaxis against endocarditis was not indicated.     The patient was placed in the dorsal lithotomy position.  Bimanual exam showed the uterus to be in the neutral position.  A Graves' speculum inserted in the vagina, and the cervix prepped with povidone iodine.  Endocervical " curettage with a Kevorkian curette was performed.     A sharp tenaculum was applied to the anterior lip of the cervix for stabilization.  A sterile uterine sound was used to sound the uterus to a depth of 11 cm.  A Pipelle endometrial aspirator was used to sample the endometrium.  Sample was sent for pathologic examination.    Condition:  Stable    Complications:  None  Patient tolerated the procedure well without complications.    Plan:    The patient was advised to call for any fever or for prolonged or severe pain or bleeding. She was advised to use NSAID as needed for mild to moderate pain. She was advised to avoid vaginal intercourse for 48 hours or until the bleeding has completely stopped.    Attending Physician Documentation:  I was present for the entire procedure.       Assessment/Plan     Diagnoses and all orders for this visit:    1. Atypical glandular cells of undetermined significance (JAZIEL) on cervical Pap smear (Primary)  -     Reference Histopathology  -     Ambulatory Referral to Gynecologic Oncology    2. Intramural and submucous leiomyoma of uterus  -     Reference Histopathology  -     Ambulatory Referral to Gynecologic Oncology    3. Pelvic mass  -     Ambulatory Referral to Gynecologic Oncology    4. Abnormal uterine bleeding (AUB)  -     Ambulatory Referral to Gynecologic Oncology          No follow-ups on file.    Aki Graham MD   7/15/2021 12:16 EDT

## 2021-07-19 LAB
PATH REPORT.FINAL DX SPEC: NORMAL
PATH REPORT.GROSS SPEC: NORMAL
PATH REPORT.RELEVANT HX SPEC: NORMAL
PATH REPORT.SITE OF ORIGIN SPEC: NORMAL
PATHOLOGIST NAME: NORMAL
PAYMENT PROCEDURE: NORMAL

## 2022-06-08 DIAGNOSIS — Z13.1 DIABETES MELLITUS SCREENING: ICD-10-CM

## 2022-06-08 DIAGNOSIS — R53.83 FATIGUE, UNSPECIFIED TYPE: ICD-10-CM

## 2022-06-08 DIAGNOSIS — Z13.220 SCREENING CHOLESTEROL LEVEL: Primary | ICD-10-CM

## 2022-06-09 ENCOUNTER — LAB (OUTPATIENT)
Dept: FAMILY MEDICINE CLINIC | Facility: CLINIC | Age: 47
End: 2022-06-09

## 2022-06-09 DIAGNOSIS — R53.83 FATIGUE, UNSPECIFIED TYPE: ICD-10-CM

## 2022-06-09 DIAGNOSIS — Z13.220 SCREENING CHOLESTEROL LEVEL: ICD-10-CM

## 2022-06-09 DIAGNOSIS — Z13.1 DIABETES MELLITUS SCREENING: ICD-10-CM

## 2022-06-10 LAB
ALBUMIN SERPL-MCNC: 4.6 G/DL (ref 3.8–4.8)
ALBUMIN/GLOB SERPL: 2.9 {RATIO} (ref 1.2–2.2)
ALP SERPL-CCNC: 59 IU/L (ref 44–121)
ALT SERPL-CCNC: 15 IU/L (ref 0–32)
AST SERPL-CCNC: 18 IU/L (ref 0–40)
BASOPHILS # BLD AUTO: 0.1 X10E3/UL (ref 0–0.2)
BASOPHILS NFR BLD AUTO: 2 %
BILIRUB SERPL-MCNC: 0.8 MG/DL (ref 0–1.2)
BUN SERPL-MCNC: 13 MG/DL (ref 6–24)
BUN/CREAT SERPL: 19 (ref 9–23)
CALCIUM SERPL-MCNC: 9.4 MG/DL (ref 8.7–10.2)
CHLORIDE SERPL-SCNC: 101 MMOL/L (ref 96–106)
CHOLEST SERPL-MCNC: 151 MG/DL (ref 100–199)
CHOLEST/HDLC SERPL: 2.7 RATIO (ref 0–4.4)
CO2 SERPL-SCNC: 24 MMOL/L (ref 20–29)
CREAT SERPL-MCNC: 0.7 MG/DL (ref 0.57–1)
EGFRCR SERPLBLD CKD-EPI 2021: 107 ML/MIN/1.73
EOSINOPHIL # BLD AUTO: 0.1 X10E3/UL (ref 0–0.4)
EOSINOPHIL NFR BLD AUTO: 2 %
ERYTHROCYTE [DISTWIDTH] IN BLOOD BY AUTOMATED COUNT: 12.5 % (ref 11.7–15.4)
GLOBULIN SER CALC-MCNC: 1.6 G/DL (ref 1.5–4.5)
GLUCOSE SERPL-MCNC: 70 MG/DL (ref 65–99)
HCT VFR BLD AUTO: 49 % (ref 34–46.6)
HDLC SERPL-MCNC: 55 MG/DL
HGB BLD-MCNC: 15.5 G/DL (ref 11.1–15.9)
IMM GRANULOCYTES # BLD AUTO: 0 X10E3/UL (ref 0–0.1)
IMM GRANULOCYTES NFR BLD AUTO: 1 %
LDLC SERPL CALC-MCNC: 85 MG/DL (ref 0–99)
LYMPHOCYTES # BLD AUTO: 0.9 X10E3/UL (ref 0.7–3.1)
LYMPHOCYTES NFR BLD AUTO: 12 %
MCH RBC QN AUTO: 27.1 PG (ref 26.6–33)
MCHC RBC AUTO-ENTMCNC: 31.6 G/DL (ref 31.5–35.7)
MCV RBC AUTO: 86 FL (ref 79–97)
MONOCYTES # BLD AUTO: 0.5 X10E3/UL (ref 0.1–0.9)
MONOCYTES NFR BLD AUTO: 6 %
NEUTROPHILS # BLD AUTO: 6.2 X10E3/UL (ref 1.4–7)
NEUTROPHILS NFR BLD AUTO: 77 %
PLATELET # BLD AUTO: 624 X10E3/UL (ref 150–450)
POTASSIUM SERPL-SCNC: 4.9 MMOL/L (ref 3.5–5.2)
PROT SERPL-MCNC: 6.2 G/DL (ref 6–8.5)
RBC # BLD AUTO: 5.72 X10E6/UL (ref 3.77–5.28)
SODIUM SERPL-SCNC: 140 MMOL/L (ref 134–144)
SPECIMEN STATUS: NORMAL
TRIGL SERPL-MCNC: 53 MG/DL (ref 0–149)
VLDLC SERPL CALC-MCNC: 11 MG/DL (ref 5–40)
WBC # BLD AUTO: 7.9 X10E3/UL (ref 3.4–10.8)

## 2022-06-14 ENCOUNTER — OFFICE VISIT (OUTPATIENT)
Dept: FAMILY MEDICINE CLINIC | Facility: CLINIC | Age: 47
End: 2022-06-14

## 2022-06-14 VITALS
DIASTOLIC BLOOD PRESSURE: 62 MMHG | OXYGEN SATURATION: 99 % | HEIGHT: 61 IN | BODY MASS INDEX: 20.96 KG/M2 | HEART RATE: 67 BPM | SYSTOLIC BLOOD PRESSURE: 108 MMHG | WEIGHT: 111 LBS

## 2022-06-14 DIAGNOSIS — L21.0 SEBORRHEA CAPITIS: ICD-10-CM

## 2022-06-14 DIAGNOSIS — J45.909 REACTIVE AIRWAY DISEASE WITHOUT COMPLICATION: ICD-10-CM

## 2022-06-14 DIAGNOSIS — Z00.00 ANNUAL PHYSICAL EXAM: Primary | ICD-10-CM

## 2022-06-14 DIAGNOSIS — Z12.31 ENCOUNTER FOR SCREENING MAMMOGRAM FOR MALIGNANT NEOPLASM OF BREAST: ICD-10-CM

## 2022-06-14 DIAGNOSIS — L20.81 ATOPIC NEURODERMATITIS: ICD-10-CM

## 2022-06-14 PROBLEM — D21.9 LEIOMYOMA: Status: ACTIVE | Noted: 2022-06-14

## 2022-06-14 PROBLEM — N85.2 ENLARGED UTERUS: Status: ACTIVE | Noted: 2022-06-14

## 2022-06-14 PROBLEM — N93.9 ABNORMAL UTERINE BLEEDING: Status: ACTIVE | Noted: 2022-06-14

## 2022-06-14 PROCEDURE — 99396 PREV VISIT EST AGE 40-64: CPT | Performed by: NURSE PRACTITIONER

## 2022-06-14 RX ORDER — ALBUTEROL SULFATE 90 UG/1
2 AEROSOL, METERED RESPIRATORY (INHALATION)
Qty: 18 G | Refills: 3 | Status: SHIPPED | OUTPATIENT
Start: 2022-06-14

## 2022-06-14 RX ORDER — SELENIUM SULFIDE 2.5 MG/100ML
LOTION TOPICAL DAILY PRN
Qty: 118 ML | Refills: 2 | Status: SHIPPED | OUTPATIENT
Start: 2022-06-14

## 2022-06-14 RX ORDER — TRIAMCINOLONE ACETONIDE 0.25 MG/G
CREAM TOPICAL 2 TIMES DAILY
Qty: 80 G | Refills: 1 | Status: SHIPPED | OUTPATIENT
Start: 2022-06-14

## 2022-06-14 NOTE — PATIENT INSTRUCTIONS
I will call you with your lab results.   Please call with any questions or concerns.    Return in about 1 year (around 2023) for Annual, Labs.    Annual Wellness  Personal Prevention Plan of Service     Date of Office Visit:    Encounter Provider:  BOB Edmonds  Place of Service:  [facility]  Patient Name: Nicolle Bruce  :  1975    As part of the Annual Wellness portion of your visit today, we are providing you with this personalized preventive plan of services (PPPS). This plan is based upon recommendations of the [place] Preventive Services Task Force (USPSTF) and the Advisory Committee on Immunization Practices (ACIP).    This lists the preventive care services that should be considered, and provides dates of when you are due. Items listed as completed are up-to-date and do not require any further intervention.    Health Maintenance   Topic Date Due    COLORECTAL CANCER SCREENING  Never done    TDAP/TD VACCINES (1 - Tdap) Never done    INFLUENZA VACCINE  10/01/2022    ANNUAL PHYSICAL  06/15/2023    PAP SMEAR  2024    HEPATITIS C SCREENING  Completed    COVID-19 Vaccine  Completed    Pneumococcal Vaccine 0-64  Aged Out       Orders Placed This Encounter   Procedures    Mammo Screening Digital Tomosynthesis Bilateral With CAD     Standing Status:   Future     Standing Expiration Date:   2023     Order Specific Question:   Reason for Exam:     Answer:   Breast cancer screening     Order Specific Question:   Patient Pregnant     Answer:   No       Return in about 1 year (around 2023) for Annual, Labs.

## 2022-06-14 NOTE — PROGRESS NOTES
Preventive Exam    History of Present Illness: Nicolle Bruce is a 46 y.o. here for check up and review of routine health maintenance. she states she is doing well and has no concerns.    Past medical history, surgical history and family history have been reviewed.     Review of Systems   Constitutional: Negative for appetite change, chills, fatigue, fever, unexpected weight gain and unexpected weight loss.   HENT: Negative for congestion, dental problem, postnasal drip, rhinorrhea, sinus pressure and sore throat.         Dental exam is up to date.    Eyes: Negative.  Negative for blurred vision, double vision, photophobia and visual disturbance.        Had Lasik. Eye exam is due.    Respiratory: Negative for cough, chest tightness, shortness of breath and wheezing.    Cardiovascular: Negative for chest pain, palpitations and leg swelling.   Gastrointestinal: Negative for abdominal pain, constipation, diarrhea, nausea and vomiting.   Endocrine: Negative.  Negative for cold intolerance and heat intolerance.   Genitourinary: Negative.  Negative for breast discharge, breast lump, breast pain, dysuria, flank pain, frequency, menstrual problem, pelvic pain, pelvic pressure, vaginal bleeding and vaginal discharge.   Musculoskeletal: Negative for arthralgias, back pain, joint swelling and myalgias.   Skin: Negative.    Allergic/Immunologic: Negative.  Negative for environmental allergies and food allergies.   Neurological: Negative for dizziness, weakness, numbness and headache.   Hematological: Negative.    Psychiatric/Behavioral: Negative.  Negative for sleep disturbance and depressed mood. The patient is not nervous/anxious.        PHYSICAL EXAM    Vitals:    06/14/22 0829   BP: 108/62   Pulse: 67   SpO2: 99%     Body mass index is 20.97 kg/m².      Physical Exam  Vitals and nursing note reviewed.   Constitutional:       Appearance: Normal appearance. She is well-developed and normal weight.   HENT:       Head: Normocephalic and atraumatic.      Right Ear: Tympanic membrane, ear canal and external ear normal.      Left Ear: Tympanic membrane, ear canal and external ear normal.      Nose: Nose normal.      Mouth/Throat:      Lips: Pink.      Mouth: Mucous membranes are moist.      Tongue: No lesions.      Palate: No mass and lesions.      Pharynx: Oropharynx is clear. Uvula midline.      Tonsils: No tonsillar exudate.   Eyes:      Conjunctiva/sclera: Conjunctivae normal.      Pupils: Pupils are equal, round, and reactive to light.   Neck:      Thyroid: No thyromegaly.   Cardiovascular:      Rate and Rhythm: Normal rate and regular rhythm.      Pulses: Normal pulses.           Dorsalis pedis pulses are 2+ on the right side and 2+ on the left side.        Posterior tibial pulses are 2+ on the right side and 2+ on the left side.      Heart sounds: Normal heart sounds. No murmur heard.  Pulmonary:      Effort: Pulmonary effort is normal.      Breath sounds: Normal breath sounds.   Chest:   Breasts:      Right: No supraclavicular adenopathy.      Left: No supraclavicular adenopathy.       Abdominal:      General: Bowel sounds are normal. There is no distension.      Palpations: Abdomen is soft.      Tenderness: There is no abdominal tenderness.   Musculoskeletal:         General: No deformity. Normal range of motion.      Cervical back: Normal range of motion and neck supple.      Right lower leg: No edema.      Left lower leg: No edema.   Lymphadenopathy:      Head:      Right side of head: No submental, submandibular, tonsillar, preauricular, posterior auricular or occipital adenopathy.      Left side of head: No submental, submandibular, tonsillar, preauricular, posterior auricular or occipital adenopathy.      Cervical: No cervical adenopathy.      Right cervical: No superficial, deep or posterior cervical adenopathy.     Left cervical: No superficial, deep or posterior cervical adenopathy.      Upper Body:      Right  upper body: No supraclavicular adenopathy.      Left upper body: No supraclavicular adenopathy.   Skin:     General: Skin is warm and dry.      Capillary Refill: Capillary refill takes 2 to 3 seconds.   Neurological:      General: No focal deficit present.      Mental Status: She is alert and oriented to person, place, and time.      Cranial Nerves: Cranial nerves are intact. No cranial nerve deficit.      Sensory: Sensation is intact.      Motor: Motor function is intact.      Coordination: Coordination is intact.      Gait: Gait is intact.   Psychiatric:         Attention and Perception: Attention and perception normal.         Mood and Affect: Mood normal.         Speech: Speech normal.         Behavior: Behavior normal. Behavior is cooperative.         Thought Content: Thought content normal.         Cognition and Memory: Cognition and memory normal.         Judgment: Judgment normal.         Procedures    Diagnoses and all orders for this visit:    1. Annual physical exam (Primary)    2. Encounter for screening mammogram for malignant neoplasm of breast  -     Mammo Screening Digital Tomosynthesis Bilateral With CAD; Future    3. Reactive airway disease without complication  -     albuterol sulfate HFA (ProAir HFA) 108 (90 Base) MCG/ACT inhaler; Inhale 2 puffs 4 (Four) Times a Day.  Dispense: 18 g; Refill: 3    4. Atopic neurodermatitis  Comments:  Dermatologist gave her this cream and it works. She would like a refill. Needs it when she gets really hot.   Orders:  -     triamcinolone (KENALOG) 0.025 % cream; Apply  topically to the appropriate area as directed 2 (Two) Times a Day.  Dispense: 80 g; Refill: 1    5. Seborrhea capitis  -     selenium sulfide (SELSUN) 2.5 % lotion; Apply  topically to the appropriate area as directed Daily As Needed for Itching or Dandruff.  Dispense: 118 mL; Refill: 2        Problems Addressed this Visit        Pulmonary and Pneumonias    Reactive airway disease without  complication    Relevant Medications    albuterol sulfate HFA (ProAir HFA) 108 (90 Base) MCG/ACT inhaler       Skin    Seborrhea capitis    Relevant Medications    triamcinolone (KENALOG) 0.025 % cream    selenium sulfide (SELSUN) 2.5 % lotion      Other Visit Diagnoses     Annual physical exam    -  Primary    Encounter for screening mammogram for malignant neoplasm of breast        Relevant Orders    Mammo Screening Digital Tomosynthesis Bilateral With CAD    Atopic neurodermatitis        Dermatologist gave her this cream and it works. She would like a refill. Needs it when she gets really hot.     Relevant Medications    triamcinolone (KENALOG) 0.025 % cream    selenium sulfide (SELSUN) 2.5 % lotion      Diagnoses       Codes Comments    Annual physical exam    -  Primary ICD-10-CM: Z00.00  ICD-9-CM: V70.0     Encounter for screening mammogram for malignant neoplasm of breast     ICD-10-CM: Z12.31  ICD-9-CM: V76.12     Reactive airway disease without complication     ICD-10-CM: J45.909  ICD-9-CM: 493.90     Atopic neurodermatitis     ICD-10-CM: L20.81  ICD-9-CM: 691.8 Dermatologist gave her this cream and it works. She would like a refill. Needs it when she gets really hot.     Seborrhea capitis     ICD-10-CM: L21.0  ICD-9-CM: 690.11         Refilled patient's medications.   Labs from 6/9/2022 reviewed with patient at visit.  Discussed colorectal cancer screening, including Cologuard. Patient refuses at this time.   Mammogram ordered  Routine health maintenance reviewed and discussed with Nicolle Bruce.    Preventative counseling regarding healthy diet and exercise.   Pt reports that he wears a seatbelt regularly.    Return in about 1 year (around 6/14/2023) for Annual, Labs.

## 2022-06-23 ENCOUNTER — HOSPITAL ENCOUNTER (OUTPATIENT)
Dept: MAMMOGRAPHY | Facility: HOSPITAL | Age: 47
Discharge: HOME | End: 2022-06-23
Admitting: NURSE PRACTITIONER

## 2022-06-23 DIAGNOSIS — Z12.31 ENCOUNTER FOR SCREENING MAMMOGRAM FOR MALIGNANT NEOPLASM OF BREAST: ICD-10-CM

## 2022-06-23 PROCEDURE — 77067 SCR MAMMO BI INCL CAD: CPT

## 2022-06-23 PROCEDURE — 77063 BREAST TOMOSYNTHESIS BI: CPT

## 2023-05-19 DIAGNOSIS — Z12.11 COLON CANCER SCREENING: Primary | ICD-10-CM

## 2023-06-06 ENCOUNTER — PRE-PROCEDURE SCREENING (OUTPATIENT)
Dept: GASTROENTEROLOGY | Facility: CLINIC | Age: 48
End: 2023-06-06
Payer: COMMERCIAL

## 2023-06-06 NOTE — TELEPHONE ENCOUNTER
Colonoscopy Screening--No personal history of polyps--No family  history  of polyps or   colon cancer--No blood thinners--Medications:                      albuterol sulfate HFA (ProAir HFA) 108 (90 Base) MCG/ACT inhaler  aspirin 81 MG tablet  selenium sulfide (SELSUN) 2.5 % lotion  triamcinolone (KENALOG) 0.025 % cream                    QUESTIONNAIRE SCREENING  SCAN IN  MEDIA & HAS BEEN SENT TO DOCTOR FOR REVIEW

## 2023-06-07 ENCOUNTER — PREP FOR SURGERY (OUTPATIENT)
Dept: GASTROENTEROLOGY | Facility: CLINIC | Age: 48
End: 2023-06-07
Payer: COMMERCIAL

## 2023-06-07 DIAGNOSIS — Z12.11 ENCOUNTER FOR SCREENING FOR MALIGNANT NEOPLASM OF COLON: Primary | ICD-10-CM

## 2023-06-08 PROBLEM — Z12.11 ENCOUNTER FOR SCREENING FOR MALIGNANT NEOPLASM OF COLON: Status: ACTIVE | Noted: 2023-06-08

## 2024-02-16 NOTE — PROGRESS NOTES
Assessment & Plan  1. Health maintenance.  The patient is doing very well. She would like a referral for a mammogram in the summer.    2. Aging wrinkles.  She is developing some aging wrinkles that she is concerned about. She was interested in using some T[place]or[place], which I am not comfortable prescribing, but we will send her to dermatology for consult.    Orders Placed This Encounter   Procedures    Mammo Screening Digital Tomosynthesis Bilateral With CAD    Ambulatory Referral to Dermatology      Patient was given instructions and counseling regarding her condition or for health maintenance advice. Please see specific information pulled into the AVS if appropriate.          Nicolle is a 48 y.o. being seen today for  Annual Exam   HISTORY    HPIAnnual Exam: Patient presents for annual exam.  findings; last pap : approximate date 2021 and was normal  Last mammo : approximate date 6/2022 and was normal   The patient is not (had hysterectomy) still having menses.  Last colonoscopy: colonoscopy 1 years ago without abnormalities.    The patient wears seatbelts: yes.  She is eating a healthy diet.   The patient regularly exercises: yes. She does  she bikes but not as much in the winter    This patient has ever been tested for HepC : yes    She does see a dentist regularly.   Is she using sunscreen: yes  Her immunization are not up-to-date.    Labs reviewed, mildly elevated K due to trauma with lab draw.  Social History  She  reports that she has quit smoking. Her smoking use included cigars. She has never used smokeless tobacco. She reports current alcohol use of about 1.0 standard drink of alcohol per week. She reports that she does not use drugs.  EXAM DATA    Vital Signs        BP Readings from Last 1 Encounters:   02/19/24 118/78     Wt Readings from Last 3 Encounters:   02/19/24 53.1 kg (117 lb)   07/12/23 51.7 kg (114 lb)   06/14/22 50.3 kg (111 lb)   Body mass index is 22.12 kg/m².  Physical Exam  She is a very  healthy appearing woman.Normal weight  Sclera is clear.  TMs are clear. Pharynx is clear.  Neck Without nodularity or thyromegaly.  Lungs are Clear to auscultation.  Heart: Regular rate and rhythm. No murmurs.  Abdomen is soft and nontender.No masses or megaly  Judgment is normal. Behavior is normal.

## 2024-02-19 ENCOUNTER — OFFICE VISIT (OUTPATIENT)
Dept: FAMILY MEDICINE CLINIC | Facility: CLINIC | Age: 49
End: 2024-02-19
Payer: COMMERCIAL

## 2024-02-19 VITALS
SYSTOLIC BLOOD PRESSURE: 118 MMHG | OXYGEN SATURATION: 99 % | HEIGHT: 61 IN | WEIGHT: 117 LBS | RESPIRATION RATE: 17 BRPM | HEART RATE: 70 BPM | DIASTOLIC BLOOD PRESSURE: 78 MMHG | BODY MASS INDEX: 22.09 KG/M2

## 2024-02-19 DIAGNOSIS — L20.81 ATOPIC NEURODERMATITIS: ICD-10-CM

## 2024-02-19 DIAGNOSIS — L98.9 SKIN LESION: ICD-10-CM

## 2024-02-19 DIAGNOSIS — Z12.31 ENCOUNTER FOR SCREENING MAMMOGRAM FOR BREAST CANCER: Primary | ICD-10-CM

## 2024-02-19 DIAGNOSIS — L21.0 SEBORRHEA CAPITIS: ICD-10-CM

## 2024-02-19 DIAGNOSIS — J45.909 REACTIVE AIRWAY DISEASE WITHOUT COMPLICATION: ICD-10-CM

## 2024-02-19 PROBLEM — N85.2 ENLARGED UTERUS: Status: RESOLVED | Noted: 2022-06-14 | Resolved: 2024-02-19

## 2024-02-19 PROBLEM — D21.9 LEIOMYOMA: Status: RESOLVED | Noted: 2022-06-14 | Resolved: 2024-02-19

## 2024-02-19 PROBLEM — N93.9 ABNORMAL UTERINE BLEEDING: Status: RESOLVED | Noted: 2022-06-14 | Resolved: 2024-02-19

## 2024-02-19 PROBLEM — Z12.11 ENCOUNTER FOR SCREENING FOR MALIGNANT NEOPLASM OF COLON: Status: RESOLVED | Noted: 2023-06-08 | Resolved: 2024-02-19

## 2024-02-19 PROBLEM — D25.0 INTRAMURAL AND SUBMUCOUS LEIOMYOMA OF UTERUS: Status: RESOLVED | Noted: 2021-06-25 | Resolved: 2024-02-19

## 2024-02-19 PROBLEM — R19.00 PELVIC MASS: Status: RESOLVED | Noted: 2021-06-25 | Resolved: 2024-02-19

## 2024-02-19 PROBLEM — D25.1 INTRAMURAL AND SUBMUCOUS LEIOMYOMA OF UTERUS: Status: RESOLVED | Noted: 2021-06-25 | Resolved: 2024-02-19

## 2024-02-19 PROCEDURE — 90471 IMMUNIZATION ADMIN: CPT | Performed by: FAMILY MEDICINE

## 2024-02-19 PROCEDURE — 99396 PREV VISIT EST AGE 40-64: CPT | Performed by: FAMILY MEDICINE

## 2024-02-19 PROCEDURE — 90715 TDAP VACCINE 7 YRS/> IM: CPT | Performed by: FAMILY MEDICINE

## 2024-02-19 RX ORDER — TRIAMCINOLONE ACETONIDE 0.25 MG/G
CREAM TOPICAL 2 TIMES DAILY
Qty: 80 G | Refills: 1 | Status: SHIPPED | OUTPATIENT
Start: 2024-02-19

## 2024-02-19 RX ORDER — ALBUTEROL SULFATE 90 UG/1
2 AEROSOL, METERED RESPIRATORY (INHALATION)
Qty: 18 G | Refills: 3 | Status: SHIPPED | OUTPATIENT
Start: 2024-02-19

## 2024-02-19 RX ORDER — SELENIUM SULFIDE 2.5 MG/100ML
LOTION TOPICAL DAILY PRN
Qty: 118 ML | Refills: 2 | Status: SHIPPED | OUTPATIENT
Start: 2024-02-19

## 2024-06-10 ENCOUNTER — HOSPITAL ENCOUNTER (OUTPATIENT)
Dept: MAMMOGRAPHY | Facility: HOSPITAL | Age: 49
Discharge: HOME | End: 2024-06-10
Admitting: FAMILY MEDICINE
Payer: COMMERCIAL

## 2024-06-10 DIAGNOSIS — Z12.31 ENCOUNTER FOR SCREENING MAMMOGRAM FOR BREAST CANCER: ICD-10-CM

## 2024-06-10 PROCEDURE — 77067 SCR MAMMO BI INCL CAD: CPT

## 2024-06-10 PROCEDURE — 77063 BREAST TOMOSYNTHESIS BI: CPT

## 2025-03-10 ENCOUNTER — OFFICE VISIT (OUTPATIENT)
Dept: FAMILY MEDICINE CLINIC | Facility: CLINIC | Age: 50
End: 2025-03-10
Payer: COMMERCIAL

## 2025-03-10 VITALS
HEIGHT: 60 IN | DIASTOLIC BLOOD PRESSURE: 80 MMHG | WEIGHT: 120.9 LBS | RESPIRATION RATE: 16 BRPM | BODY MASS INDEX: 23.74 KG/M2 | SYSTOLIC BLOOD PRESSURE: 128 MMHG | OXYGEN SATURATION: 98 % | HEART RATE: 65 BPM

## 2025-03-10 DIAGNOSIS — D47.3 ESSENTIAL THROMBOCYTOSIS: ICD-10-CM

## 2025-03-10 DIAGNOSIS — L20.81 ATOPIC NEURODERMATITIS: ICD-10-CM

## 2025-03-10 DIAGNOSIS — J45.909 REACTIVE AIRWAY DISEASE WITHOUT COMPLICATION: ICD-10-CM

## 2025-03-10 DIAGNOSIS — Z00.00 HEALTHCARE MAINTENANCE: Primary | ICD-10-CM

## 2025-03-10 PROCEDURE — 99396 PREV VISIT EST AGE 40-64: CPT | Performed by: FAMILY MEDICINE

## 2025-03-10 RX ORDER — TRIAMCINOLONE ACETONIDE 0.25 MG/G
CREAM TOPICAL 2 TIMES DAILY
Qty: 80 G | Refills: 1 | Status: SHIPPED | OUTPATIENT
Start: 2025-03-10

## 2025-03-10 RX ORDER — ALBUTEROL SULFATE 90 UG/1
2 INHALANT RESPIRATORY (INHALATION)
Qty: 18 G | Refills: 3 | Status: SHIPPED | OUTPATIENT
Start: 2025-03-10

## 2025-03-10 NOTE — PROGRESS NOTES
Assessment & Plan  Healthcare maintenance         Reactive airway disease without complication    Orders:    albuterol sulfate HFA (ProAir HFA) 108 (90 Base) MCG/ACT inhaler; Inhale 2 puffs 4 (Four) Times a Day.    Atopic neurodermatitis    Orders:    triamcinolone (KENALOG) 0.025 % cream; Apply  topically to the appropriate area as directed 2 (Two) Times a Day.    Essential thrombocytosis               Elevated platelet count.  Her blood work indicates an elevated platelet count of 598 more concerning is her H/H, suggested polycythema. NL LFTs. Will d/w hematology before referring.     Asthma.  She reports needing her inhaler for intermittent asthma. A prescription for her inhaler will be renewed.    dermatitis  A prescription for her topical cream will be renewed.    Health maintenance.  Her cholesterol levels are within the normal range. She had a colonoscopy in 2023 and is due for another in 2033. She is up to date on her colonoscopy and mammogram screenings. She is seeing her dentist regularly.     Patient was given instructions and counseling regarding her condition or for health maintenance advice. Please see specific information pulled into the AVS if appropriate.          Nicolle is a 49 y.o. being seen today for  Annual Exam   HISTORY    HPI   This patient has ever been tested for HepC : yes       The patient is a 49-year-old female who presents for evaluation of elevated platelet count, asthma, and health maintenance.    She has been adhering to her aspirin regimen with increased consistency. She has not sought consultation from a hematologist.    She maintains a healthy lifestyle through regular exercise, including weightlifting, and a balanced diet. She is fasting more now to lose weight. She is having breakfast and dinner but skips lunch. She is up to date on her colonoscopy and mammogram screenings. She is seeing her dentist regularly.    She is requesting refills for her topical cream and  inhaler. She does not need Selsun lotion.    FAMILY HISTORY  Her father does not have cholesterol issues.     Review of Systems   All other systems reviewed and are negative.    Social History  She  reports that she has quit smoking. Her smoking use included cigars. She has never used smokeless tobacco. She reports current alcohol use of about 1.0 standard drink of alcohol per week. She reports that she does not use drugs.  EXAM DATA    Vital Signs        BP Readings from Last 1 Encounters:   03/10/25 128/80     Wt Readings from Last 3 Encounters:   03/10/25 54.8 kg (120 lb 14.4 oz)   02/19/24 53.1 kg (117 lb)   07/12/23 51.7 kg (114 lb)   Body mass index is 23.61 kg/m².  Physical Exam  Vitals reviewed.   Constitutional:       General: She is not in acute distress.     Appearance: She is well-developed.   HENT:      Head: Normocephalic and atraumatic.      Right Ear: Tympanic membrane, ear canal and external ear normal.      Left Ear: Tympanic membrane, ear canal and external ear normal.   Eyes:      Conjunctiva/sclera: Conjunctivae normal.   Neck:      Thyroid: No thyromegaly.      Trachea: No tracheal deviation.   Cardiovascular:      Rate and Rhythm: Normal rate and regular rhythm.      Heart sounds: Normal heart sounds.   Pulmonary:      Effort: Pulmonary effort is normal. No respiratory distress.      Breath sounds: Normal breath sounds. No wheezing or rales.   Abdominal:      General: Bowel sounds are normal. There is no distension.      Palpations: Abdomen is soft.      Tenderness: There is no abdominal tenderness.   Musculoskeletal:         General: No deformity.      Cervical back: Normal range of motion and neck supple.   Lymphadenopathy:      Cervical: No cervical adenopathy.   Skin:     General: Skin is warm and dry.   Neurological:      Mental Status: She is alert and oriented to person, place, and time.   Psychiatric:         Behavior: Behavior normal.         Thought Content: Thought content normal.          Judgment: Judgment normal.              Patient or patient representative verbalized consent for the use of Ambient Listening during the visit with  Viola Castorena MD for chart documentation. 3/10/2025  09:31 EDT

## 2025-03-10 NOTE — ASSESSMENT & PLAN NOTE
Orders:    albuterol sulfate HFA (ProAir HFA) 108 (90 Base) MCG/ACT inhaler; Inhale 2 puffs 4 (Four) Times a Day.

## 2025-03-11 DIAGNOSIS — D47.3 ESSENTIAL THROMBOCYTOSIS: Primary | ICD-10-CM

## 2025-03-11 DIAGNOSIS — D75.1 POLYCYTHEMIA: ICD-10-CM

## 2025-04-03 DIAGNOSIS — D47.3 ESSENTIAL THROMBOCYTOSIS: ICD-10-CM

## 2025-04-03 DIAGNOSIS — D75.1 POLYCYTHEMIA: ICD-10-CM

## 2025-04-18 ENCOUNTER — APPOINTMENT (OUTPATIENT)
Dept: LAB | Facility: HOSPITAL | Age: 50
End: 2025-04-18
Payer: COMMERCIAL

## 2025-04-18 ENCOUNTER — CONSULT (OUTPATIENT)
Dept: ONCOLOGY | Facility: CLINIC | Age: 50
End: 2025-04-18
Payer: COMMERCIAL

## 2025-04-18 VITALS
SYSTOLIC BLOOD PRESSURE: 136 MMHG | HEIGHT: 60 IN | BODY MASS INDEX: 22.64 KG/M2 | OXYGEN SATURATION: 98 % | DIASTOLIC BLOOD PRESSURE: 83 MMHG | TEMPERATURE: 98 F | WEIGHT: 115.3 LBS | HEART RATE: 70 BPM

## 2025-04-18 DIAGNOSIS — E83.19 IRON EXCESS: ICD-10-CM

## 2025-04-18 DIAGNOSIS — D47.3 ESSENTIAL THROMBOCYTOSIS: Primary | ICD-10-CM

## 2025-04-18 LAB
ALBUMIN SERPL-MCNC: 4.3 G/DL (ref 3.5–5.2)
ALBUMIN/GLOB SERPL: 2.5 G/DL
ALP SERPL-CCNC: 80 U/L (ref 39–117)
ALT SERPL W P-5'-P-CCNC: 20 U/L (ref 1–33)
ANION GAP SERPL CALCULATED.3IONS-SCNC: 8.9 MMOL/L (ref 5–15)
AST SERPL-CCNC: 24 U/L (ref 1–32)
BASOPHILS # BLD AUTO: 0.14 10*3/MM3 (ref 0–0.2)
BASOPHILS NFR BLD AUTO: 1.6 % (ref 0–1.5)
BILIRUB SERPL-MCNC: 0.8 MG/DL (ref 0–1.2)
BUN SERPL-MCNC: 14 MG/DL (ref 6–20)
BUN/CREAT SERPL: 23 (ref 7–25)
CALCIUM SPEC-SCNC: 9 MG/DL (ref 8.6–10.5)
CHLORIDE SERPL-SCNC: 102 MMOL/L (ref 98–107)
CO2 SERPL-SCNC: 26.1 MMOL/L (ref 22–29)
CREAT SERPL-MCNC: 0.61 MG/DL (ref 0.57–1)
DEPRECATED RDW RBC AUTO: 44.6 FL (ref 37–54)
EGFRCR SERPLBLD CKD-EPI 2021: 108.4 ML/MIN/1.73
EOSINOPHIL # BLD AUTO: 0.15 10*3/MM3 (ref 0–0.4)
EOSINOPHIL NFR BLD AUTO: 1.7 % (ref 0.3–6.2)
ERYTHROCYTE [DISTWIDTH] IN BLOOD BY AUTOMATED COUNT: 14.3 % (ref 12.3–15.4)
FERRITIN SERPL-MCNC: 338 NG/ML (ref 13–150)
GLOBULIN UR ELPH-MCNC: 1.7 GM/DL
GLUCOSE SERPL-MCNC: 84 MG/DL (ref 65–99)
HCT VFR BLD AUTO: 49.9 % (ref 34–46.6)
HGB BLD-MCNC: 15.5 G/DL (ref 12–15.9)
IMM GRANULOCYTES # BLD AUTO: 0.03 10*3/MM3 (ref 0–0.05)
IMM GRANULOCYTES NFR BLD AUTO: 0.3 % (ref 0–0.5)
IRON 24H UR-MRATE: 171 MCG/DL (ref 37–145)
IRON SATN MFR SERPL: 59 % (ref 20–50)
LDH SERPL-CCNC: 381 U/L (ref 135–214)
LYMPHOCYTES # BLD AUTO: 0.81 10*3/MM3 (ref 0.7–3.1)
LYMPHOCYTES NFR BLD AUTO: 9.1 % (ref 19.6–45.3)
MCH RBC QN AUTO: 27 PG (ref 26.6–33)
MCHC RBC AUTO-ENTMCNC: 31.1 G/DL (ref 31.5–35.7)
MCV RBC AUTO: 86.8 FL (ref 79–97)
MONOCYTES # BLD AUTO: 0.48 10*3/MM3 (ref 0.1–0.9)
MONOCYTES NFR BLD AUTO: 5.4 % (ref 5–12)
NEUTROPHILS NFR BLD AUTO: 7.3 10*3/MM3 (ref 1.7–7)
NEUTROPHILS NFR BLD AUTO: 81.9 % (ref 42.7–76)
NRBC BLD AUTO-RTO: 0 /100 WBC (ref 0–0.2)
PLATELET # BLD AUTO: 595 10*3/MM3 (ref 140–450)
PMV BLD AUTO: 9.8 FL (ref 6–12)
POTASSIUM SERPL-SCNC: 4 MMOL/L (ref 3.5–5.2)
PROT SERPL-MCNC: 6 G/DL (ref 6–8.5)
RBC # BLD AUTO: 5.75 10*6/MM3 (ref 3.77–5.28)
SODIUM SERPL-SCNC: 137 MMOL/L (ref 136–145)
TIBC SERPL-MCNC: 291 MCG/DL (ref 298–536)
TRANSFERRIN SERPL-MCNC: 195 MG/DL (ref 200–360)
WBC NRBC COR # BLD AUTO: 8.91 10*3/MM3 (ref 3.4–10.8)

## 2025-04-18 PROCEDURE — 36415 COLL VENOUS BLD VENIPUNCTURE: CPT | Performed by: INTERNAL MEDICINE

## 2025-04-18 PROCEDURE — 82728 ASSAY OF FERRITIN: CPT | Performed by: INTERNAL MEDICINE

## 2025-04-18 PROCEDURE — 83540 ASSAY OF IRON: CPT | Performed by: INTERNAL MEDICINE

## 2025-04-18 PROCEDURE — 83615 LACTATE (LD) (LDH) ENZYME: CPT | Performed by: INTERNAL MEDICINE

## 2025-04-18 PROCEDURE — 85025 COMPLETE CBC W/AUTO DIFF WBC: CPT | Performed by: INTERNAL MEDICINE

## 2025-04-18 PROCEDURE — 80053 COMPREHEN METABOLIC PANEL: CPT | Performed by: INTERNAL MEDICINE

## 2025-04-18 PROCEDURE — 84466 ASSAY OF TRANSFERRIN: CPT | Performed by: INTERNAL MEDICINE

## 2025-04-18 NOTE — PROGRESS NOTES
"Hematology Initial Note    Encounter Date: 4/18/2025    Referred by: Viola Castorena Md  [address] [place] [id number]       Hematology History  04/18/2025: Ms. Nicolle Bruce is a 49 y.o. female who is here for evaluation of previously diagnosed essential thrombocytosis.  She was initially diagnosed in 2009 when she had elevated platelet counts and was started on aspirin 81 mg daily.  She did not receive any cytoreductive agents or phlebotomy.  At the time of diagnosis, she has been having menorrhagia and later had hysterectomy done.  She has received blood transfusion during hysterectomy due to increased bleeding.  She has been having hot flashes and night sweats, night sweats occur few times a week and pruritus with shower.  She denied any weight loss, fevers.  She has not been taking any iron supplements.  Labs done with her PCP on 3/6/2025 showed hemoglobin of 18, hematocrit 55.1     Past Medical History  she  has a past medical history of Asthma, Fibroids, Hepatitis A, and Thrombocythemia, essential.    Past Surgical History  she  has a past surgical history that includes [place] tooth extraction; Hysterectomy (09/2021); and Colonoscopy (N/A, 7/12/2023).    Family History  family history includes Cancer in her father; Stomach cancer in her father.     Social History   reports that she has quit smoking. Her smoking use included cigars. She has never used smokeless tobacco. She reports current alcohol use of about 1.0 standard drink of alcohol per week. She reports that she does not use drugs.    Review of Systems: 10 point review of systems negative except for ones mentioned in HPI.    Objective   /83   Pulse 70   Temp 98 °F (36.7 °C) (Temporal)   Ht 152.4 cm (60\")   Wt 52.3 kg (115 lb 4.8 oz)   LMP 07/12/2021 (Within Days)   SpO2 98%   BMI 22.52 kg/m²   Body surface area is 1.48 meters squared.  Body mass index is 22.52 kg/m².  Physical Exam  Vitals reviewed. "   Constitutional:       Appearance: Normal appearance.   HENT:      Head: Normocephalic.      Nose: Nose normal.      Mouth/Throat:      Mouth: Mucous membranes are moist.   Eyes:      General: No scleral icterus.     Conjunctiva/sclera: Conjunctivae normal.   Cardiovascular:      Rate and Rhythm: Normal rate.      Pulses: Normal pulses.   Pulmonary:      Effort: Pulmonary effort is normal.      Breath sounds: Normal breath sounds.   Musculoskeletal:      Cervical back: Normal range of motion.   Skin:     General: Skin is warm.   Neurological:      Mental Status: She is alert. Mental status is at baseline.   Psychiatric:         Mood and Affect: Mood normal.         Behavior: Behavior normal.           Imaging/Labs  CBC w/diff          3/6/2025    15:17 4/18/2025    14:43   CBC w/Diff   WBC 10.11  8.91    RBC 6.72  5.75    Hemoglobin 18.0  15.5    Hematocrit 55.1  49.9    MCV 82.0  86.8    MCH 26.8  27.0    MCHC 32.7  31.1    RDW 14.0  14.3    Platelets 598  595    Neutrophil Rel % 84.5  81.9    Immature Granulocyte Rel %  0.3    Lymphocyte Rel % 7.7  9.1    Monocyte Rel % 5.1  5.4    Eosinophil Rel % 1.0  1.7    Basophil Rel % 1.3  1.6      CMP          3/6/2025    15:17 4/18/2025    14:43   CMP   Glucose 61  84    BUN 11  14    Creatinine 0.61  0.61    EGFR 108.4  108.4    Sodium 138  137    Potassium 5.0  4.0    Chloride 101  102    Calcium 9.6  9.0    Total Protein 5.9  6.0    Albumin 4.4  4.3    Globulin 1.5  1.7    Total Bilirubin 1.0  0.8    Alkaline Phosphatase 83  80    AST (SGOT) 23  24    ALT (SGPT) 15  20    Albumin/Globulin Ratio 2.9  2.5    BUN/Creatinine Ratio 18.0  23.0    Anion Gap  8.9      Estimated Creatinine Clearance: 90.1 mL/min (by C-G formula based on SCr of 0.61 mg/dL).    Assessment & Plan   Essential thrombocytosis    Iron excess     - From evaluating recent labs where her hemoglobin increased to 18.0 on 3/6/2025, the diagnosis will possibly need to be changed to polycythemia vera as  multiple cell lines are likely affected.  She might have been iron deficient at initial diagnosis due to menorrhagia masking polycythemia.  Considered low risk with age less than 60.  I We cannot hear currently has vasomotor symptoms including night sweats and pruritus.      PLAN  - Continue aspirin 81 mg daily, can give trial of increasing the dose to see if vasomotor symptoms improve.  - If worsening of vasomotor symptoms, thrombocytosis/hematocrit, will need to start cytoreductive agent with hydroxyurea.  Discussed with patient about plan  - No indication for phlebotomy today  - Check iron studies today.  Will get HFE gene testing if levels are elevated.  - Return to clinic in 2 months with repeat labs    I reviewed recent lab, imaging, and pathology results as available this visit and interpreted independently and discussed with the patient. 45 minutes spent with patient with more than 50% of time on face-to-face counseling.        Aasems Jacob, MD  Hematology/Oncology

## 2025-04-24 ENCOUNTER — LAB (OUTPATIENT)
Dept: LAB | Facility: HOSPITAL | Age: 50
End: 2025-04-24
Payer: COMMERCIAL

## 2025-04-24 DIAGNOSIS — D47.3 ESSENTIAL THROMBOCYTOSIS: ICD-10-CM

## 2025-04-24 DIAGNOSIS — E83.19 IRON EXCESS: ICD-10-CM

## 2025-04-24 PROCEDURE — 36415 COLL VENOUS BLD VENIPUNCTURE: CPT

## 2025-04-28 LAB
HFE GENE MUT ANL BLD/T: NORMAL
IMP & REVIEW OF LAB RESULTS: NORMAL

## 2025-04-29 PROBLEM — E83.19 IRON EXCESS: Status: ACTIVE | Noted: 2025-04-29

## 2025-04-29 PROBLEM — E83.110 HEREDITARY HEMOCHROMATOSIS: Status: ACTIVE | Noted: 2025-04-29

## 2025-05-02 ENCOUNTER — LAB (OUTPATIENT)
Dept: LAB | Facility: HOSPITAL | Age: 50
End: 2025-05-02
Payer: COMMERCIAL

## 2025-05-02 ENCOUNTER — INFUSION (OUTPATIENT)
Dept: ONCOLOGY | Facility: HOSPITAL | Age: 50
End: 2025-05-02
Payer: COMMERCIAL

## 2025-05-02 VITALS
SYSTOLIC BLOOD PRESSURE: 130 MMHG | HEART RATE: 66 BPM | OXYGEN SATURATION: 99 % | WEIGHT: 114.4 LBS | DIASTOLIC BLOOD PRESSURE: 88 MMHG | TEMPERATURE: 98.2 F | BODY MASS INDEX: 22.34 KG/M2 | RESPIRATION RATE: 16 BRPM

## 2025-05-02 DIAGNOSIS — E83.19 IRON EXCESS: ICD-10-CM

## 2025-05-02 DIAGNOSIS — E83.110 HEREDITARY HEMOCHROMATOSIS: ICD-10-CM

## 2025-05-02 DIAGNOSIS — E83.110 HEREDITARY HEMOCHROMATOSIS: Primary | ICD-10-CM

## 2025-05-02 LAB
DEPRECATED RDW RBC AUTO: 46.4 FL (ref 37–54)
ERYTHROCYTE [DISTWIDTH] IN BLOOD BY AUTOMATED COUNT: 14.4 % (ref 12.3–15.4)
FERRITIN SERPL-MCNC: 415 NG/ML (ref 13–150)
HCT VFR BLD AUTO: 45 % (ref 34–46.6)
HGB BLD-MCNC: 14.3 G/DL (ref 12–15.9)
MCH RBC QN AUTO: 27.9 PG (ref 26.6–33)
MCHC RBC AUTO-ENTMCNC: 31.8 G/DL (ref 31.5–35.7)
MCV RBC AUTO: 87.7 FL (ref 79–97)
PLATELET # BLD AUTO: 706 10*3/MM3 (ref 140–450)
PMV BLD AUTO: 9.7 FL (ref 6–12)
RBC # BLD AUTO: 5.13 10*6/MM3 (ref 3.77–5.28)
WBC NRBC COR # BLD AUTO: 10.46 10*3/MM3 (ref 3.4–10.8)

## 2025-05-02 PROCEDURE — 85027 COMPLETE CBC AUTOMATED: CPT

## 2025-05-02 PROCEDURE — 36415 COLL VENOUS BLD VENIPUNCTURE: CPT

## 2025-05-02 PROCEDURE — 99195 PHLEBOTOMY: CPT

## 2025-05-02 PROCEDURE — 82728 ASSAY OF FERRITIN: CPT

## 2025-05-05 ENCOUNTER — TELEPHONE (OUTPATIENT)
Dept: ONCOLOGY | Facility: CLINIC | Age: 50
End: 2025-05-05
Payer: COMMERCIAL

## 2025-05-05 ENCOUNTER — RESULTS FOLLOW-UP (OUTPATIENT)
Dept: ONCOLOGY | Facility: CLINIC | Age: 50
End: 2025-05-05
Payer: COMMERCIAL

## 2025-05-05 NOTE — TELEPHONE ENCOUNTER
Caller: Nicolle Lora    Relationship: Self    Best call back number: [phone]     What is the best time to reach you: ANYTIME    Who are you requesting to speak with (clinical staff, provider,  specific staff member): NON-CLINICAL    What was the call regarding: PT SPOKE WITH SOMEONE IN THE OFFICE ON FRIDAY. THE PT'S INSURANCE IS CHARGING HER FOR BEING OON DUE TO SEEING DR FISHER. PT WAS A PT OF DR MAZA AND DR MAZA WAS IN NETWORK. PT CAN NOT PAY OUT OF POCKET TO SEE DR FISHER'S. HAS ANYONE LOOKED INTO THIS FOR THE PT.     Is it okay if the provider responds through [employer]:  YES    PT IS AWARE OF THE NEW APPT FOR DR FISHER, BUT MAY HAVE TO BE CHANGE IF DR FISHER IS OON.

## 2025-05-07 ENCOUNTER — LAB (OUTPATIENT)
Dept: LAB | Facility: HOSPITAL | Age: 50
End: 2025-05-07
Payer: COMMERCIAL

## 2025-05-07 ENCOUNTER — INFUSION (OUTPATIENT)
Dept: ONCOLOGY | Facility: HOSPITAL | Age: 50
End: 2025-05-07
Payer: COMMERCIAL

## 2025-05-07 VITALS
BODY MASS INDEX: 23.08 KG/M2 | DIASTOLIC BLOOD PRESSURE: 79 MMHG | RESPIRATION RATE: 16 BRPM | OXYGEN SATURATION: 99 % | WEIGHT: 118.2 LBS | TEMPERATURE: 97.5 F | HEART RATE: 74 BPM | SYSTOLIC BLOOD PRESSURE: 109 MMHG

## 2025-05-07 DIAGNOSIS — E83.19 IRON EXCESS: ICD-10-CM

## 2025-05-07 DIAGNOSIS — E83.110 HEREDITARY HEMOCHROMATOSIS: Primary | ICD-10-CM

## 2025-05-07 DIAGNOSIS — E83.110 HEREDITARY HEMOCHROMATOSIS: ICD-10-CM

## 2025-05-07 LAB
DEPRECATED RDW RBC AUTO: 47 FL (ref 37–54)
ERYTHROCYTE [DISTWIDTH] IN BLOOD BY AUTOMATED COUNT: 14.5 % (ref 12.3–15.4)
FERRITIN SERPL-MCNC: 389 NG/ML (ref 13–150)
HCT VFR BLD AUTO: 39.5 % (ref 34–46.6)
HGB BLD-MCNC: 12.3 G/DL (ref 12–15.9)
MCH RBC QN AUTO: 27.8 PG (ref 26.6–33)
MCHC RBC AUTO-ENTMCNC: 31.1 G/DL (ref 31.5–35.7)
MCV RBC AUTO: 89.4 FL (ref 79–97)
PLATELET # BLD AUTO: 696 10*3/MM3 (ref 140–450)
PMV BLD AUTO: 9.9 FL (ref 6–12)
RBC # BLD AUTO: 4.42 10*6/MM3 (ref 3.77–5.28)
WBC NRBC COR # BLD AUTO: 9.06 10*3/MM3 (ref 3.4–10.8)

## 2025-05-07 PROCEDURE — 36415 COLL VENOUS BLD VENIPUNCTURE: CPT

## 2025-05-07 PROCEDURE — 82728 ASSAY OF FERRITIN: CPT

## 2025-05-07 PROCEDURE — 85027 COMPLETE CBC AUTOMATED: CPT

## 2025-05-07 PROCEDURE — 99195 PHLEBOTOMY: CPT

## 2025-05-07 RX ORDER — SODIUM CHLORIDE 9 MG/ML
250 INJECTION, SOLUTION INTRAVENOUS ONCE
OUTPATIENT
Start: 2025-05-14

## 2025-05-07 RX ORDER — SODIUM CHLORIDE 9 MG/ML
250 INJECTION, SOLUTION INTRAVENOUS ONCE
Status: DISCONTINUED | OUTPATIENT
Start: 2025-05-07 | End: 2025-05-07 | Stop reason: HOSPADM

## 2025-05-07 NOTE — TELEPHONE ENCOUNTER
Spoke with [employer] provider on-boarding team, and this has been corrected for Dr. Mosqueda. His start date with our proactice needed to be updated. Once updated, Dr. Mosqueda would be in-network for patient's plan for DOS 4/18/25 and moving forward.    Spoke with patient and relayed this information to her. Patient v/u and will keep future appointments.

## 2025-05-12 ENCOUNTER — TELEPHONE (OUTPATIENT)
Dept: ONCOLOGY | Facility: CLINIC | Age: 50
End: 2025-05-12

## 2025-05-12 NOTE — TELEPHONE ENCOUNTER
Caller: Nicolle Lora    Relationship: Self    Best call back number: [phone]     What is the best time to reach you: ANYTIME    Who are you requesting to speak with (clinical staff, provider,  specific staff member):     What was the call regarding: PLEASE CANCEL PATIENT'S 5/15 APPTS - SHE DOESN'T NEED TO R/S.

## 2025-05-14 ENCOUNTER — TELEPHONE (OUTPATIENT)
Dept: ONCOLOGY | Facility: CLINIC | Age: 50
End: 2025-05-14
Payer: COMMERCIAL

## 2025-05-14 NOTE — TELEPHONE ENCOUNTER
Caller: Nicolle Lora    Relationship to patient: Self    Best call back number: [phone]    Chief complaint: RESCHEDULE     Type of visit: LAB AND FOLLOW UP     Requested date: 5-19 , 5-22    If rescheduling, when is the original appointment: 5-21     Additional notes:PLEASE ADVISE

## 2025-06-11 ENCOUNTER — OFFICE VISIT (OUTPATIENT)
Dept: ONCOLOGY | Facility: CLINIC | Age: 50
End: 2025-06-11
Payer: COMMERCIAL

## 2025-06-11 ENCOUNTER — LAB (OUTPATIENT)
Dept: LAB | Facility: HOSPITAL | Age: 50
End: 2025-06-11
Payer: COMMERCIAL

## 2025-06-11 ENCOUNTER — APPOINTMENT (OUTPATIENT)
Dept: ONCOLOGY | Facility: HOSPITAL | Age: 50
End: 2025-06-11
Payer: COMMERCIAL

## 2025-06-11 VITALS
HEART RATE: 70 BPM | HEIGHT: 60 IN | WEIGHT: 117.7 LBS | BODY MASS INDEX: 23.11 KG/M2 | DIASTOLIC BLOOD PRESSURE: 70 MMHG | RESPIRATION RATE: 17 BRPM | TEMPERATURE: 98.2 F | OXYGEN SATURATION: 98 % | SYSTOLIC BLOOD PRESSURE: 131 MMHG

## 2025-06-11 DIAGNOSIS — E83.110 HEREDITARY HEMOCHROMATOSIS: ICD-10-CM

## 2025-06-11 DIAGNOSIS — D47.3 ESSENTIAL THROMBOCYTOSIS: Primary | ICD-10-CM

## 2025-06-11 DIAGNOSIS — E83.19 IRON EXCESS: ICD-10-CM

## 2025-06-11 LAB
DEPRECATED RDW RBC AUTO: 43.9 FL (ref 37–54)
ERYTHROCYTE [DISTWIDTH] IN BLOOD BY AUTOMATED COUNT: 13.1 % (ref 12.3–15.4)
FERRITIN SERPL-MCNC: 37.4 NG/ML (ref 13–150)
HCT VFR BLD AUTO: 45.3 % (ref 34–46.6)
HGB BLD-MCNC: 13.9 G/DL (ref 12–15.9)
MCH RBC QN AUTO: 28.4 PG (ref 26.6–33)
MCHC RBC AUTO-ENTMCNC: 30.7 G/DL (ref 31.5–35.7)
MCV RBC AUTO: 92.6 FL (ref 79–97)
PLATELET # BLD AUTO: 676 10*3/MM3 (ref 140–450)
PMV BLD AUTO: 10.2 FL (ref 6–12)
RBC # BLD AUTO: 4.89 10*6/MM3 (ref 3.77–5.28)
WBC NRBC COR # BLD AUTO: 8.99 10*3/MM3 (ref 3.4–10.8)

## 2025-06-11 PROCEDURE — 82728 ASSAY OF FERRITIN: CPT

## 2025-06-11 PROCEDURE — 85027 COMPLETE CBC AUTOMATED: CPT

## 2025-06-11 PROCEDURE — 36415 COLL VENOUS BLD VENIPUNCTURE: CPT

## 2025-06-11 RX ORDER — HYDROXYUREA 500 MG/1
500 CAPSULE ORAL DAILY
Qty: 30 CAPSULE | Refills: 2 | Status: SHIPPED | OUTPATIENT
Start: 2025-06-11 | End: 2025-09-09

## 2025-06-11 NOTE — PROGRESS NOTES
"Hematology Clinic Follow up Note    Encounter Date: 6/11/2025    Subjective   Chief Complaint   Patient presents with    Follow-up          Hematology History  04/18/2025: Ms. Nicolle Bruce is a 49 y.o. female who is here for evaluation of previously diagnosed essential thrombocytosis.  She was initially diagnosed in 2009 when she had elevated platelet counts and was started on aspirin 81 mg daily.  She did not receive any cytoreductive agents or phlebotomy.  At the time of diagnosis, she has been having menorrhagia and later had hysterectomy done.  She has received blood transfusion during hysterectomy due to increased bleeding.  She has been having hot flashes and night sweats, night sweats occur few times a week and pruritus with shower.  She denied any weight loss, fevers.  She has not been taking any iron supplements.  Labs done with her PCP on 3/6/2025 showed hemoglobin of 18, hematocrit 55.1     6/11/2025: She is here for follow-up of the results and further management.  Test done on previous visit including JAK2 V617F mutation was positive.  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  She received 1 unit phlebotomy with ferritin coming down to 37.4 from 415.  Platelets are remaining at 676 today.  She continues to have vasomotor symptoms including night sweats, pruritus, fatigue.       Objective   /70   Pulse 70   Temp 98.2 °F (36.8 °C) (Oral)   Resp 17   Ht 152 cm (59.84\")   Wt 53.4 kg (117 lb 11.2 oz)   LMP 07/12/2021 (Within Days)   SpO2 98%   BMI 23.11 kg/m²    Physical Exam  Vitals reviewed.   Constitutional:       Appearance: Normal appearance.   HENT:      Head: Normocephalic.      Nose: Nose normal.      Mouth/Throat:      Mouth: Mucous membranes are moist.   Eyes:      General: No scleral icterus.     Conjunctiva/sclera: Conjunctivae normal.   Cardiovascular:      Rate and " Rhythm: Normal rate.      Pulses: Normal pulses.   Pulmonary:      Effort: Pulmonary effort is normal.      Breath sounds: Normal breath sounds.   Musculoskeletal:      Cervical back: Normal range of motion.   Skin:     General: Skin is warm.   Neurological:      Mental Status: She is alert. Mental status is at baseline.   Psychiatric:         Mood and Affect: Mood normal.         Behavior: Behavior normal.         Review of systems: 10 point review of systems negative except for ones mentioned in HPI/interval history.    Labs, Imaging, Pathology:  CBC w/diff          5/2/2025    08:12 5/7/2025    08:20 6/11/2025    10:35   CBC w/Diff   WBC 10.46  9.06  8.99    RBC 5.13  4.42  4.89    Hemoglobin 14.3  12.3  13.9    Hematocrit 45.0  39.5  45.3    MCV 87.7  89.4  92.6    MCH 27.9  27.8  28.4    MCHC 31.8  31.1  30.7    RDW 14.4  14.5  13.1    Platelets 706  696  676       CMP          3/6/2025    15:17 4/18/2025    14:43   CMP   Glucose 61  84    BUN 11  14    Creatinine 0.61  0.61    EGFR 108.4  108.4    Sodium 138  137    Potassium 5.0  4.0    Chloride 101  102    Calcium 9.6  9.0    Total Protein 5.9  6.0    Albumin 4.4  4.3    Globulin 1.5  1.7    Total Bilirubin 1.0  0.8    Alkaline Phosphatase 83  80    AST (SGOT) 23  24    ALT (SGPT) 15  20    Albumin/Globulin Ratio 2.9  2.5    BUN/Creatinine Ratio 18.0  23.0    Anion Gap  8.9            Assessment & Plan   Diagnoses and all orders for this visit:    1. Essential thrombocytosis (Primary)    2. Hereditary hemochromatosis    Other orders  -     hydroxyurea (HYDREA) 500 MG capsule; Take 1 capsule by mouth Daily for 90 days.  Dispense: 30 capsule; Refill: 2    -JAK2 V617F positive.  From evaluation of her recent labs where her hemoglobin increased to 18 on 3/6/2025, the diagnosis will possibly be polycythemia vera as multiple cell lines are likely affected.  She might have been iron deficient in the past due to menorrhagia which was masking polycythemia.   Considered low risk with age less than 60 and no history of thrombosis.  Currently has vasomotor symptoms including night sweats pruritus, fatigue.  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  Will repeat labs again in 1 month.  Advised to call for any adverse events.  - Continue aspirin 81 mg daily.  - Recommend getting bone marrow biopsy done to confirm diagnosis of PCV which is more treatment options, however patient wants to delay it for now and will let us know after discussing with her .  - Started phlebotomy, with normalization of ferritin and hemoglobin.  No indication for phlebotomy today.  Possibly need phlebotomy every 3 months.  - Will schedule patient with Dr. Nino who has seen her in the past    I reviewed recent lab, imaging, and pathology results as available this visit and interpreted independently and discussed with the patient.  45 minutes spent with patient with more than 50% of time on face-to-face counseling.     Aasems Jacob, MD  Hematology/Oncology

## 2025-07-08 NOTE — PROGRESS NOTES
.     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 year old female  who is here for follow-up with the above-mentioned history.    She saw me years ago but stopped following up.  She saw Dr. Mosqueda, a [facility]'s doctor working with us and was started on phlebotomies and he ordered Hydrea.  She stated she was not comfortable starting the Hydrea and therefore did not start it.  It is reported in her chart that she had fatigue and night sweats.  She does not recall having fatigue.  She states she has plenty of energy.  She states she thinks her night sweats are due to menopause.    Denies fevers chills weight loss night sweats    Past Medical History:   Diagnosis Date    Asthma     Fibroids     Hepatitis A     pt reports having as a kid    Thrombocythemia, essential      Past Surgical History:   Procedure Laterality Date    COLONOSCOPY N/A 7/12/2023    Procedure: COLONOSCOPY TO CECUM;  Surgeon: Nolan Morales MD;  Location: [facility] ENDOSCOPY;  Service: Gastroenterology;  Laterality: N/A;  PRE- SCREENING  POST-HEMORRHOIDS    HYSTERECTOMY  09/2021    WISDOM TOOTH EXTRACTION         MEDICATIONS    Current Outpatient Medications:     albuterol sulfate HFA (ProAir HFA) 108 (90 Base) MCG/ACT inhaler, Inhale 2 puffs 4 (Four) Times a Day., Disp: 18 g, Rfl: 3    aspirin 81 MG tablet, Take  by mouth., Disp: , Rfl:     selenium sulfide (SELSUN) 2.5 % lotion, Apply  topically to the appropriate area as directed Daily As Needed for Itching or Dandruff., Disp: 118 mL, Rfl: 2    triamcinolone (KENALOG) 0.025 % cream, Apply  topically to the appropriate area as directed 2 (Two) Times a Day., Disp: 80 g, Rfl: 1    hydroxyurea (HYDREA) 500 MG capsule, Take 1 capsule by mouth Daily for 90 days. (Patient not taking: Reported on 7/9/2025), Disp: 30 capsule, Rfl: 2  No current facility-administered medications for this visit.    Facility-Administered Medications Ordered  "in Other Visits:     sodium chloride 0.9 % infusion 250 mL, 250 mL, Intravenous, Once, Jacob, Aasems, MD    ALLERGIES:   No Known Allergies    SOCIAL HISTORY:       Social History     Socioeconomic History    Marital status:      Spouse name: Hugo   Tobacco Use    Smoking status: Former     Types: Cigars    Smokeless tobacco: Never   Vaping Use    Vaping status: Former   Substance and Sexual Activity    Alcohol use: Yes     Alcohol/week: 1.0 standard drink of alcohol     Types: 1 Glasses of wine per week     Comment: occa    Drug use: No    Sexual activity: Yes     Partners: Male     Birth control/protection: None         FAMILY HISTORY:  Family History   Problem Relation Age of Onset    Stomach cancer Father     Cancer Father         Stomach cancer       REVIEW OF SYSTEMS:  Review of Systems   Constitutional:  Negative for activity change.   HENT:  Negative for nosebleeds and trouble swallowing.    Respiratory:  Negative for shortness of breath and wheezing.    Cardiovascular:  Negative for chest pain and palpitations.   Gastrointestinal:  Negative for constipation, diarrhea and nausea.   Genitourinary:  Negative for dysuria and hematuria.   Musculoskeletal:  Negative for arthralgias and myalgias.   Skin:  Negative for rash and wound.   Neurological:  Negative for seizures and syncope.   Hematological:  Negative for adenopathy. Does not bruise/bleed easily.   Psychiatric/Behavioral:  Negative for confusion.             Vitals:    07/09/25 0805   BP: 130/87   Pulse: 69   Temp: 98 °F (36.7 °C)   TempSrc: Oral   SpO2: 95%   Weight: 52.9 kg (116 lb 9.6 oz)   Height: 152 cm (59.84\")   PainSc: 0-No pain         7/9/2025     8:05 AM   Current Status   ECOG score 0        PHYSICAL EXAM:        CONSTITUTIONAL:  Vital signs reviewed.  No distress, looks comfortable.  EYES:  Conjunctiva and lids unremarkable.  PERRLA  EARS,NOSE,MOUTH,THROAT:  Ears and nose appear unremarkable.  Lips, teeth, gums appear " unremarkable.  RESPIRATORY:  Normal respiratory effort.  Lungs clear to auscultation bilaterally.  CARDIOVASCULAR:  Normal S1, S2.  No murmurs rubs or gallops.  No significant lower extremity edema.  GASTROINTESTINAL: Abdomen appears unremarkable.  Nontender.  No hepatomegaly.  No splenomegaly.  LYMPHATIC:  No cervical, supraclavicular, axillary lymphadenopathy.  SKIN:  Warm.  No rashes.  PSYCHIATRIC:  Normal judgment and insight.  Normal mood and affect.        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  8/21/09 FISH for BCR/ABL negative, EWA-2 positive for the deletion of V617F mutation. On 8/21/09 ferritin 27, erythropoietin 3.4. On 8/21/09 serum iron 71, TIBC 331, iron percent saturation 21%.   (Iron deficiency could be suppressing polycythemia).  On aspirin 81 mg daily since July 2009 when elevated PLT discovered.  She stopped following up but reestablished care on 4/18/2025 with our office when she saw Dr. Singh, a locums working with us at that time  Initially thought to be essential thrombocythemia, but developed polycythemia after hysterectomy which stopped menorrhagia and corrected prior iron deficiency which was suppressing polycythemia.  Since RBC line also elevated, diagnosis changed to polycythemia vera  3/6/2025: Hb 18, HCT 55.1.  (Hysterectomy had stopped menorrhagia).  She complained of night sweats, pruritus with showering,  and fatigue  6/11/2025: Dr. Singh ordered Hydrea 500 mg daily continued aspirin 81 mg daily.  7/9/2025 (reestablished care with me).    Patient states she did not start Hydrea because she did not feel comfortable starting this.  She states she does not recall saying she had fatigue.  She thinks she has had plenty of energy all along.  She feels her night sweats are due to menopause.  Although she has had a hysterectomy, she still has her ovaries.  She does have pruritus after hot showers but this only last 10 minutes and is tolerable  She prefers HCT threshold for phlebotomy 45 instead of 42 (guidelines recommend at least 45.  Some experts recommend 42 for females).  No prior history of thrombosis and <60 years old, therefore agree with holding off on Hydrea as patient requests.    *Heterozygous H63D hemochromatosis mutation  Performed due to elevated ferritin of 415 which dropped to 37.4 after 500 mL phlebotomy  7/9/2025 (reestablished care with me) because she is a heterozygote for only 1 gene mutation and because she easily becomes iron deficient, she is not believed to have clinical hemochromatosis and therefore we will not continue to follow ferritins    *Previously saw me but stopped following up until she reestablished care in our office on 4/18/2025 with Dr. Mosqueda and then reestablished care with me on 7/9/2025    *Pruritus after hot showers  7/9/2025: I recommended using Dove body wash for bathing and Cetaphil or CeraVe lotion for her dry skin, and using cool water if possible for showers    Plan  CBC every 3 weeks. Phlebotomy if HCT 45 or higher. MD CBC phlebotomy roughly 3 months   Continue aspirin 81 mg daily    71 minutes.  Total time.  Same day.  I also spent time reviewing guidelines.

## 2025-07-09 ENCOUNTER — LAB (OUTPATIENT)
Dept: LAB | Facility: HOSPITAL | Age: 50
End: 2025-07-09
Payer: COMMERCIAL

## 2025-07-09 ENCOUNTER — INFUSION (OUTPATIENT)
Dept: ONCOLOGY | Facility: HOSPITAL | Age: 50
End: 2025-07-09
Payer: COMMERCIAL

## 2025-07-09 ENCOUNTER — OFFICE VISIT (OUTPATIENT)
Dept: ONCOLOGY | Facility: CLINIC | Age: 50
End: 2025-07-09
Payer: COMMERCIAL

## 2025-07-09 VITALS
BODY MASS INDEX: 22.89 KG/M2 | HEART RATE: 69 BPM | DIASTOLIC BLOOD PRESSURE: 87 MMHG | OXYGEN SATURATION: 95 % | WEIGHT: 116.6 LBS | SYSTOLIC BLOOD PRESSURE: 130 MMHG | TEMPERATURE: 98 F | HEIGHT: 60 IN

## 2025-07-09 VITALS — DIASTOLIC BLOOD PRESSURE: 89 MMHG | SYSTOLIC BLOOD PRESSURE: 147 MMHG | HEART RATE: 58 BPM

## 2025-07-09 DIAGNOSIS — E83.110 HEREDITARY HEMOCHROMATOSIS: Primary | ICD-10-CM

## 2025-07-09 DIAGNOSIS — E83.110 HEREDITARY HEMOCHROMATOSIS: ICD-10-CM

## 2025-07-09 DIAGNOSIS — E83.19 IRON EXCESS: ICD-10-CM

## 2025-07-09 DIAGNOSIS — D47.3 ESSENTIAL THROMBOCYTOSIS: ICD-10-CM

## 2025-07-09 LAB
DEPRECATED RDW RBC AUTO: 40 FL (ref 37–54)
ERYTHROCYTE [DISTWIDTH] IN BLOOD BY AUTOMATED COUNT: 13.1 % (ref 12.3–15.4)
FERRITIN SERPL-MCNC: 13.1 NG/ML (ref 13–150)
HCT VFR BLD AUTO: 53.4 % (ref 34–46.6)
HGB BLD-MCNC: 16.2 G/DL (ref 12–15.9)
MCH RBC QN AUTO: 25.6 PG (ref 26.6–33)
MCHC RBC AUTO-ENTMCNC: 30.3 G/DL (ref 31.5–35.7)
MCV RBC AUTO: 84.5 FL (ref 79–97)
PLATELET # BLD AUTO: 570 10*3/MM3 (ref 140–450)
PMV BLD AUTO: 10.1 FL (ref 6–12)
RBC # BLD AUTO: 6.32 10*6/MM3 (ref 3.77–5.28)
WBC NRBC COR # BLD AUTO: 8.15 10*3/MM3 (ref 3.4–10.8)

## 2025-07-09 PROCEDURE — 82728 ASSAY OF FERRITIN: CPT

## 2025-07-09 PROCEDURE — 36415 COLL VENOUS BLD VENIPUNCTURE: CPT

## 2025-07-09 PROCEDURE — 85027 COMPLETE CBC AUTOMATED: CPT

## 2025-07-09 PROCEDURE — 99195 PHLEBOTOMY: CPT

## 2025-07-09 RX ORDER — SODIUM CHLORIDE 9 MG/ML
250 INJECTION, SOLUTION INTRAVENOUS ONCE
Status: DISCONTINUED | OUTPATIENT
Start: 2025-07-09 | End: 2025-07-09 | Stop reason: HOSPADM

## 2025-07-09 RX ORDER — SODIUM CHLORIDE 9 MG/ML
250 INJECTION, SOLUTION INTRAVENOUS ONCE
OUTPATIENT
Start: 2025-07-16

## 2025-07-30 ENCOUNTER — APPOINTMENT (OUTPATIENT)
Dept: ONCOLOGY | Facility: HOSPITAL | Age: 50
End: 2025-07-30
Payer: COMMERCIAL

## 2025-07-30 ENCOUNTER — LAB (OUTPATIENT)
Dept: LAB | Facility: HOSPITAL | Age: 50
End: 2025-07-30
Payer: COMMERCIAL

## 2025-07-30 ENCOUNTER — INFUSION (OUTPATIENT)
Dept: ONCOLOGY | Facility: HOSPITAL | Age: 50
End: 2025-07-30
Payer: COMMERCIAL

## 2025-07-30 VITALS
BODY MASS INDEX: 22.73 KG/M2 | OXYGEN SATURATION: 100 % | SYSTOLIC BLOOD PRESSURE: 133 MMHG | DIASTOLIC BLOOD PRESSURE: 87 MMHG | WEIGHT: 115.8 LBS | TEMPERATURE: 97.5 F | HEART RATE: 63 BPM | RESPIRATION RATE: 16 BRPM

## 2025-07-30 DIAGNOSIS — E83.110 HEREDITARY HEMOCHROMATOSIS: Primary | ICD-10-CM

## 2025-07-30 DIAGNOSIS — E83.110 HEREDITARY HEMOCHROMATOSIS: ICD-10-CM

## 2025-07-30 DIAGNOSIS — E83.19 IRON EXCESS: ICD-10-CM

## 2025-07-30 DIAGNOSIS — D47.3 ESSENTIAL THROMBOCYTOSIS: ICD-10-CM

## 2025-07-30 LAB
BASOPHILS # BLD AUTO: 0.16 10*3/MM3 (ref 0–0.2)
BASOPHILS NFR BLD AUTO: 1.9 % (ref 0–1.5)
DEPRECATED RDW RBC AUTO: 40.7 FL (ref 37–54)
EOSINOPHIL # BLD AUTO: 0.25 10*3/MM3 (ref 0–0.4)
EOSINOPHIL NFR BLD AUTO: 2.9 % (ref 0.3–6.2)
ERYTHROCYTE [DISTWIDTH] IN BLOOD BY AUTOMATED COUNT: 14.2 % (ref 12.3–15.4)
HCT VFR BLD AUTO: 46.8 % (ref 34–46.6)
HGB BLD-MCNC: 14.3 G/DL (ref 12–15.9)
IMM GRANULOCYTES # BLD AUTO: 0.05 10*3/MM3 (ref 0–0.05)
IMM GRANULOCYTES NFR BLD AUTO: 0.6 % (ref 0–0.5)
LYMPHOCYTES # BLD AUTO: 1.04 10*3/MM3 (ref 0.7–3.1)
LYMPHOCYTES NFR BLD AUTO: 12.2 % (ref 19.6–45.3)
MCH RBC QN AUTO: 24.2 PG (ref 26.6–33)
MCHC RBC AUTO-ENTMCNC: 30.6 G/DL (ref 31.5–35.7)
MCV RBC AUTO: 79.1 FL (ref 79–97)
MONOCYTES # BLD AUTO: 0.51 10*3/MM3 (ref 0.1–0.9)
MONOCYTES NFR BLD AUTO: 6 % (ref 5–12)
NEUTROPHILS NFR BLD AUTO: 6.53 10*3/MM3 (ref 1.7–7)
NEUTROPHILS NFR BLD AUTO: 76.4 % (ref 42.7–76)
NRBC BLD AUTO-RTO: 0.2 /100 WBC (ref 0–0.2)
PLATELET # BLD AUTO: 605 10*3/MM3 (ref 140–450)
PMV BLD AUTO: 12.5 FL (ref 6–12)
RBC # BLD AUTO: 5.92 10*6/MM3 (ref 3.77–5.28)
WBC NRBC COR # BLD AUTO: 8.54 10*3/MM3 (ref 3.4–10.8)

## 2025-07-30 PROCEDURE — 85025 COMPLETE CBC W/AUTO DIFF WBC: CPT

## 2025-07-30 PROCEDURE — 36415 COLL VENOUS BLD VENIPUNCTURE: CPT

## 2025-07-30 PROCEDURE — 99195 PHLEBOTOMY: CPT

## 2025-07-30 RX ORDER — SODIUM CHLORIDE 9 MG/ML
250 INJECTION, SOLUTION INTRAVENOUS ONCE
OUTPATIENT
Start: 2025-08-06

## 2025-08-22 ENCOUNTER — INFUSION (OUTPATIENT)
Dept: ONCOLOGY | Facility: HOSPITAL | Age: 50
End: 2025-08-22
Payer: COMMERCIAL

## 2025-08-22 ENCOUNTER — LAB (OUTPATIENT)
Dept: LAB | Facility: HOSPITAL | Age: 50
End: 2025-08-22
Payer: COMMERCIAL